# Patient Record
Sex: FEMALE | Race: BLACK OR AFRICAN AMERICAN | Employment: UNEMPLOYED | URBAN - METROPOLITAN AREA
[De-identification: names, ages, dates, MRNs, and addresses within clinical notes are randomized per-mention and may not be internally consistent; named-entity substitution may affect disease eponyms.]

---

## 2017-03-15 ENCOUNTER — DOCUMENTATION ONLY (OUTPATIENT)
Dept: ONCOLOGY | Age: 62
End: 2017-03-15

## 2017-03-15 NOTE — PROGRESS NOTES
[address]   [address] [facility] [place], [address] [facility], [address] [place] [address]     Paget's Disease Survivorship Care Plan    GENERAL INFORMATION    NAME/AGE/GENDER: Anamaria Chen is a 63 y.o. female who was born on 1955. 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 Trena Leary     Please continue to see your primary care provider for all general health care recommended for a woman your age, including cancer screening tests. You no longer need RIGHT mammograms.     Possible late and long-term effects that someone with this type of cancer and treatment may experience: lymphedema    Schedule of Clinical Visits        Coordinating Provider  When/How often Contact information   Primary Care Provider As needed for non-cancer health care [phone]    Surgeon Return in June 2017 and Dr. Trena Leary will advise you then on schedule of future F/U visits  [phone]   Plastic Surgeon As he recommends in your particular situation [phone]     CANCER TREATMENT SUMMARY    Surgery: Date and procedure/location/findings: 10/6/16 RIGHT nipple areolar complex excision, RIGHT axillary sentinel lymph node biopsy and BILATERAL breast reduction, margins clear, 2 lymph nodes removed--both were negative for cancer; RIGHT breast tissue with DCIS present on 5 slides, LEFT breast tissue with benign findings only  16 RIGHT skin-sparing mastectomy and immediate reconstruction with tissue expander, no residual DCIS found    Radiation: No     Persistent symptoms or side effects at completion of treatment: No     Clinical Trial: No     Date of other cancer and/or recurrence of primary cancer and subsequent treatment: none, according to documentation in [address] [place] [address], [facility] electronic medical record    FAMILIAL CANCER RISK ASSESSMENT    Family history of cancer: mother and father with unknown types of cancer, now , according to [facility]    Genetic/hereditary risk factor(s) or predisposing conditions: none apparent  Genetic testing: No     CONTINUING TREATMENT    Need for Ongoing (Adjuvant) Treatment for Cancer: No, your DCIS did not have estrogen or progesterone receptors present (it was ER/[place] negative) and you would not have benefited from these treatments     DOING YOUR PART AS A CANCER SURVIVOR    Many survivors feel worried or anxious that the cancer will come back after treatment. While it often does not, its important to talk with your doctor about the possibility of the cancer returning. Most breast cancer recurrences are found by patients between visits. Tell your doctor if you notice any of the following symptoms, as they may be signs of a cancer recurrence:    · New lumps in the LEFT breast or skin of the RIGHT chest wall  · Bone pain  · Chest pain  · Abdominal pain  · Shortness of breath or difficulty breathing  · Persistent headaches  · Persistent coughing  · Rash on chest    Or any other symptoms should be brought to the attention of your provider:   1.  Anything that represents a brand new symptom   2. Anything that represents a persistent symptom   3. Anything you are worried about that might be related to the cancer coming back    Cancer survivors may experience issues with the areas listed below. If you have any concerns in these or other areas, please speak with your survivorship nurse practitioner or a member of your physician team to find out how to get help with them. Emotional and mental health, fatigue, weight changes, stopping smoking, physical functioning, insurance, financial advice/assistance, school/work issues, parenting, memory or concentration loss, fertility, sexual functioning, or any other survivorship concerns            General Guidelines for Health and Cancer Prevention/Risk Reduction      · Work to achieve and maintain a healthy weight  · Engage in regular physical activity including:  Aerobic exercise at least 150 minutes per week                            Strength training exercise at least 2 days per week    · Eat a diet that is high in vegetables, fruits, whole grains, legumes (beans) and low in saturated fats (animal fats)    · Lavonna Her not start using tobacco  · Limit alcohol consumption to no more than 1 drink per day for women/no more than 2 drinks per day for men    If you have any questions or need additional information/support, please discuss these recommendations with your doctor or nurse practitioner.          RESOURCES FOR THE JOURNEY    Breast Cancer Specific:  Living Beyond Breast Cancer www.lbbc.org  Breast Cancer. org NotSimilar.no. [facility] http://ww5.saran. [address] www.vbcf. org    General:  [employer] www.livestrong. [facility] www.cancer. [address] www.cancer. org  American Society of Clinical Oncology http://chavarria-vazquez.com/. net/research-and-advocacy/asco-care-and-treatment-  recommendations-patients/follow-care-breast-cancer  [facility] [employer] www.aicr. [place] [employer] www.canceradvocacy. [address] www.nccn. org  [employer] www. patientadvocate. org    Prepared By: Mynor MOSS  [address]  [phone] or [phone]  [email or web address]    Delivered on: 3/16/17    This [address] [place] [address] is a cancer treatment summary and follow-up plan provided to you to keep with your healthcare records and to share with your healthcare providers. This summary is a brief record of major aspects of your cancer treatment, not a detailed or comprehensive record of your care.

## 2017-04-12 ENCOUNTER — OFFICE VISIT (OUTPATIENT)
Dept: INTERNAL MEDICINE CLINIC | Age: 62
End: 2017-04-12

## 2017-04-12 VITALS
OXYGEN SATURATION: 98 % | RESPIRATION RATE: 16 BRPM | TEMPERATURE: 96.3 F | DIASTOLIC BLOOD PRESSURE: 100 MMHG | SYSTOLIC BLOOD PRESSURE: 160 MMHG | BODY MASS INDEX: 44.39 KG/M2 | WEIGHT: 276.2 LBS | HEIGHT: 66 IN | HEART RATE: 77 BPM

## 2017-04-12 DIAGNOSIS — E66.01 MORBID OBESITY, UNSPECIFIED OBESITY TYPE (HCC): ICD-10-CM

## 2017-04-12 DIAGNOSIS — I10 ESSENTIAL HYPERTENSION: Primary | ICD-10-CM

## 2017-04-12 DIAGNOSIS — R06.83 SNORES: ICD-10-CM

## 2017-04-12 RX ORDER — HYDROCHLOROTHIAZIDE 25 MG/1
25 TABLET ORAL DAILY
Qty: 30 TAB | Refills: 1 | Status: SHIPPED | OUTPATIENT
Start: 2017-04-12 | End: 2017-08-16 | Stop reason: SDUPTHER

## 2017-04-12 NOTE — PROGRESS NOTES
Subjective:     Chief Complaint   Patient presents with   [facility].        She  is a 63y.o. 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 She has an appointment for breast reconstructive surgery in 5/3/2017. Never been diagnosed with hypertension but after reviewing her BP numbers for the last one year I notice that its always been elevated. She denies any chest pain, soa or orthopnea. C/O snores at night. Sometimes she wakes up with gasping air. Never had sleep study done in the past.     No other concerns. A comprehensive review of systems was negative except for that written in the HPI. Objective:     Vitals:    04/12/17 0841 04/12/17 0912   BP: (!) 156/91 (!) 160/100   Pulse: 77    Resp: 16    Temp: 96.3 °F (35.7 °C)    TempSrc: Oral    SpO2: 98%    Weight: 276 lb 3.2 oz (125.3 kg)    Height: 5' 6\" (1.676 m)        Physical Examination: General appearance - alert, well appearing, and in no distress, oriented to person, place, and time and overweight  Mental status - alert, oriented to person, place, and time, normal mood, behavior, speech, dress, motor activity, and thought processes  Chest - clear to auscultation, no wheezes, rales or rhonchi, symmetric air entry  Heart - normal rate, regular rhythm, normal S1, S2, no murmurs, rubs, clicks or gallops  Neurological - alert, oriented, normal speech, no focal findings or movement disorder noted  Musculoskeletal - 1 + edema in both leg.      Allergies   Allergen Reactions    Other Plant, Animal, Environmental Rash and Itching     Patient reports rash and itching on hands from powder in gloves      Social History     Social History    Marital status:      Spouse name: N/A    Number of children: N/A    Years of education: N/A     Social History Main Topics    Smoking status: Never Smoker    Smokeless tobacco: Never Used    Alcohol use 0.0 oz/week     0 Standard drinks or equivalent per week      Comment: rarely    Drug use: No    Sexual activity: Not Asked     Other Topics Concern    None     Social History Narrative      Family History   Problem Relation Age of Onset    Heart Disease Mother     Hypertension Mother     Diabetes Mother     Cancer Mother      unknown    Heart Disease Father     Cancer Father      unknown    Hypertension Sister     Hypertension Brother     Hypertension Sister       Past Surgical History:   Procedure Laterality Date    HX BREAST LUMPECTOMY Right 10/6/2016    RIGHT BREAST CENTRAL LUMPECTOMY, RIGHT SENTINEL NODE BIOPSY, BILATERAL BREAST REDUCTION performed by Luis Fernando Ponce MD at [facility] MAIN OR    HX BREAST RECONSTRUCTION Right 11/18/2016    BREAST RECONSTRUCTION [facility]    HX BREAST REDUCTION Bilateral 10/6/2016    BREAST REDUCTION performed [facility] at [facility]      surgery for endometriosis   [facility]      [facility] and [facility].  HX MASTECTOMY Right 11/18/2016    RIGHT SKIN SPARING MASTECTOMY,RIGHT BREAST RECONSTRUCTION WITH TISSUE EXPANDER ALLODERM performed by Luis Fernando Ponce MD at [facility] MAIN OR    HX PARTIAL HYSTERECTOMY      HX TUBAL LIGATION        Past Medical History:   Diagnosis Date    Arthritis     Cancer ([facility] [address]     right breast    Headache     Ill-defined condition     endometrosis    Nausea & vomiting       Current Outpatient Prescriptions   Medication Sig Dispense Refill    hydroCHLOROthiazide (HYDRODIURIL) 25 mg tablet Take 1 Tab by mouth daily. Indications: hypertension 30 Tab 1    acetaminophen (TYLENOL EXTRA STRENGTH) 500 mg tablet Take 1,000 mg by mouth every six (6) hours as needed for Pain. Assessment/ Plan:   Jaylen Lopes was seen today for establish care.     Diagnoses and all orders for this visit:    Essential hypertension  -    After reviewing her last documented BP numbers and after rechecking her BP in the triage we have decided to start treating. Patient agreed with my plan. -    Will start with  hydroCHLOROthiazide (HYDRODIURIL) 25 mg tablet; Take 1 Tab by mouth daily. Indications: hypertension. Monitor BP daily. Will consider amlodipine if not controlled with above med. Discussed weight reduction strategy, low salt diet, exercise. -     METABOLIC PANEL, BASIC    Snores  -     SLEEP MEDICINE REFERRAL    Morbid obesity, unspecified obesity type  -     SLEEP MEDICINE REFERRAL  -    Discussed weight reduction strategy, low salt diet, exercise. Medication risks/benefits/costs/interactions/alternatives discussed with patient. Advised patient to call back or return to office if symptoms worsen/change/persist. If patient cannot reach us or should anything more severe/urgent arise he/she should proceed directly to the nearest emergency department. Discussed expected course/resolution/complications of diagnosis in detail with patient. Patient given a written after visit summary which includes her diagnoses, current medications and vitals. Patient expressed understanding with the diagnosis and plan. Follow-up Disposition:  Return for Bring BP log book. , complete physical  and fasting blood work. Elo Doan

## 2017-04-12 NOTE — MR AVS SNAPSHOT
Visit Information Date & Time Provider Department Dept. Phone Encounter #  
 4/12/2017  8:30 AM Amaury Ibarra MD [facility] [phone] [id number] Follow-up Instructions Return for Bring BP log book. , complete physical  and fasting blood work. Emely Rubalcava Your Appointments 6/20/2017  9:20 AM  
ESTABLISHED PATIENT with Rhonda Aldana MD  
[facility] Dr. Giovanny Arellano [facility] ([facility]) Appt Note: 6 month breast check [address] [phone]  
[address] [facility], [address] [place] [address] [address] [phone] Upcoming Health Maintenance Date Due Hepatitis C Screening 1955 Pneumococcal 19-64 Highest Risk (1 of 3 - PCV13) 11/19/1976 DTaP/Tdap/Td series (1 - Tdap) 11/19/1978 PAP AKA CERVICAL CYTOLOGY 11/19/1978 FOBT Q 1 YEAR AGE 50-75 11/19/2007 BREAST CANCER SCRN MAMMOGRAM 6/9/2018 Allergies as of 4/12/2017  Review Complete On: 4/12/2017 By: Isidro Carson MD  
  
 Severity Noted Reaction Type Reactions Other Plant, Animal, Environmental  10/06/2016    Rash, Itching Patient reports rash and itching on hands from powder in gloves Current Immunizations  Never Reviewed Name Date Influenza Vaccine (Quad) PF 10/7/2016  9:37 AM  
  
 Not reviewed this visit You Were Diagnosed With   
  
 Codes Comments Essential hypertension    -  Primary ICD-10-CM: I10 
ICD-9-CM: 401.9 Snores     ICD-10-CM: R06.83 
ICD-9-CM: 786.09 Morbid obesity, unspecified obesity type     ICD-10-CM: E66.01 
ICD-9-CM: 278.01 Vitals BP Pulse Temp Resp Height(growth percentile) Weight(growth percentile) (!) 156/91 (BP 1 Location: Left arm, BP Patient Position: Sitting) 77 96.3 °F (35.7 °C) (Oral) 16 5' 6\" (1.676 m) 276 lb 3.2 oz (125.3 kg) SpO2 BMI OB Status Smoking Status 98% 44.58 kg/m2 Hysterectomy Never Smoker Vitals History BMI and BSA Data Body Mass Index Body Surface Area 44.58 kg/m 2 2.42 m 2 Preferred Pharmacy Pharmacy Name Phone [id number] [address], [address] [phone] Your Updated Medication List  
  
   
This list is accurate as of: 4/12/17  9:08 AM.  Always use your most recent med list.  
  
  
  
  
 hydroCHLOROthiazide 25 mg tablet Commonly known as:  HYDRODIURIL Take 1 Tab by mouth daily. Indications: hypertension TYLENOL EXTRA STRENGTH 500 mg tablet Generic drug:  acetaminophen Take 1,000 mg by mouth every six (6) hours as needed for Pain. Prescriptions Sent to Pharmacy Refills  
 hydroCHLOROthiazide (HYDRODIURIL) 25 mg tablet 1 Sig: Take 1 Tab by mouth daily. Indications: hypertension Class: Normal  
 Pharmacy: [id number] [address], [place] Ph #: [phone] Route: Oral  
  
We Performed the Following METABOLIC PANEL, BASIC [52730 CPT(R)] SLEEP MEDICINE REFERRAL [ITK964 Custom] Comments:  
 Orders: 
Sleep Medicine Consult - Schedule patient for a sleep specialist consult. If appropriate, schedule patient for sleep study(s). Initiate treatment if needed. Forward correspondance to my office. Follow-up Instructions Return for Bring BP log book. , complete physical  and fasting blood work. Summer Barakat To-Do List   
 04/26/2017 10:00 AM  
  Appointment with [facility] PAT ROOM P1 at [facility] OR [facility] TESTING ([phone])  
  
 05/01/2017 9:30 AM  
  Appointment with [facility] K[facility] 1 at [address] [place] [address] ([phone]) Shower or bathe using soap and water. Do not use deodorant, powder, perfumes, or lotion the day of your exam.  If your prior mammograms were not performed at [facility] please bring films with you or forward prior images 2 days before your procedure.   Check in at registration 15min before your appointment time unless you were instructed to do otherwise. A script is not necessary for screening. If you have one, please bring it on the day of the mammogram or have it faxed to the department. [facility]  [phone] [facility] [phone] [facility] [place] [phone] [facility] [phone] Referral Information Referral ID Referred By Referred To  
  
 [id number] LOKI DREW MD   
   [address] [place] [address] Phone: [phone] Fax: [phone] Visits Status Start Date End Date 1 New Request 4/12/17 4/12/18 If your referral has a status of pending review or denied, additional information will be sent to support the outcome of this decision. Patient Instructions Low Sodium Diet (2,000 Milligram): Care Instructions Your Care Instructions Too much sodium causes your body to hold on to extra water. This can raise your blood pressure and force your heart and kidneys to work harder. In very serious cases, this could cause you to be put in the hospital. It might even be life-threatening. By limiting sodium, you will feel better and lower your risk of serious problems. The most common source of sodium is salt. People get most of the salt in their diet from canned, prepared, and packaged foods. Fast food and restaurant meals also are very high in sodium. Your doctor will probably limit your sodium to less than 2,000 milligrams (mg) a day. This limit counts all the sodium in prepared and packaged foods and any salt you add to your food. Follow-up care is a key part of your treatment and safety. Be sure to make and go to all appointments, and call your doctor if you are having problems. It's also a good idea to know your test results and keep a list of the medicines you take. How can you care for yourself at home? Read food labels · Read labels on cans and food packages.  The labels tell you how much sodium is in each serving. Make sure that you look at the serving size. If you eat more than the serving size, you have eaten more sodium. · Food labels also tell you the Percent Daily Value for sodium. Choose products with low Percent Daily Values for sodium. · Be aware that sodium can come in forms other than salt, including monosodium glutamate (MSG), sodium citrate, and sodium bicarbonate (baking soda). MSG is often added to Asian food. When you eat out, you can sometimes ask for food without MSG or added salt. Buy low-sodium foods · Buy foods that are labeled \"unsalted\" (no salt added), \"sodium-free\" (less than 5 mg of sodium per serving), or \"low-sodium\" (less than 140 mg of sodium per serving). Foods labeled \"reduced-sodium\" and \"light sodium\" may still have too much sodium. Be sure to read the label to see how much sodium you are getting. · Buy fresh vegetables, or frozen vegetables without added sauces. Buy low-sodium versions of canned vegetables, soups, and other canned goods. Prepare low-sodium meals · Cut back on the amount of salt you use in cooking. This will help you adjust to the taste. Do not add salt after cooking. One teaspoon of salt has about 2,300 mg of sodium. · Take the salt shaker off the table. · Flavor your food with garlic, lemon juice, onion, vinegar, herbs, and spices. Do not use soy sauce, lite soy sauce, steak sauce, onion salt, garlic salt, celery salt, mustard, or ketchup on your food. · Use low-sodium salad dressings, sauces, and ketchup. Or make your own salad dressings and sauces without adding salt. · Use less salt (or none) when recipes call for it. You can often use half the salt a recipe calls for without losing flavor. Other foods such as rice, pasta, and grains do not need added salt. · Rinse canned vegetables, and cook them in fresh water. This removes somebut not allof the salt.  
· Avoid water that is naturally high in sodium or that has been treated with water softeners, which add sodium. Call your local water company to find out the sodium content of your water supply. If you buy bottled water, read the label and choose a sodium-free brand. Avoid high-sodium foods · Avoid eating: ¨ Smoked, cured, salted, and canned meat, fish, and poultry. ¨ Ham, perez, hot dogs, and luncheon meats. ¨ Regular, hard, and processed cheese and regular peanut butter. ¨ Crackers with salted tops, and other salted snack foods such as pretzels, chips, and salted popcorn. ¨ Frozen prepared meals, unless labeled low-sodium. ¨ Canned and dried soups, broths, and bouillon, unless labeled sodium-free or low-sodium. ¨ Canned vegetables, unless labeled sodium-free or low-sodium. ¨ Western Sonam fries, pizza, tacos, and other fast foods. ¨ Pickles, olives, ketchup, and other condiments, especially soy sauce, unless labeled sodium-free or low-sodium. Where can you learn more? Go to http://lorneAnonymous Youmihcael.info/. Enter T996 in the search box to learn more about \"Low Sodium Diet (2,000 Milligram): Care Instructions. \" Current as of: July 26, 2016 Content Version: 11.2 © 5560-4023 [employer]. Care instructions adapted under license by [employer] (which disclaims liability or warranty for this information). If you have questions about a medical condition or this instruction, always ask your healthcare professional. [place] [address] any warranty or liability for your use of this information. Starting a Weight Loss Plan: Care Instructions Your Care Instructions If you are thinking about losing weight, it can be hard to know where to start. Your doctor can help you set up a weight loss plan that best meets your needs. You may want to take a class on nutrition or exercise, or join a weight loss support group.  If you have questions about how to make changes to your eating or exercise habits, ask your doctor about seeing a registered dietitian or an exercise specialist. 
It can be a big challenge to lose weight. But you do not have to make huge changes at once. Make small changes, and stick with them. When those changes become habit, add a few more changes. If you do not think you are ready to make changes right now, try to pick a date in the future. Make an appointment to see your doctor to discuss whether the time is right for you to start a plan. Follow-up care is a key part of your treatment and safety. Be sure to make and go to all appointments, and call your doctor if you are having problems. Its also a good idea to know your test results and keep a list of the medicines you take. How can you care for yourself at home? · Set realistic goals. Many people expect to lose much more weight than is likely. A weight loss of 5% to 10% of your body weight may be enough to improve your health. · Get family and friends involved to provide support. Talk to them about why you are trying to lose weight, and ask them to help. They can help by participating in exercise and having meals with you, even if they may be eating something different. · Find what works best for you. If you do not have time or do not like to cook, a program that offers meal replacement bars or shakes may be better for you. Or if you like to prepare meals, finding a plan that includes daily menus and recipes may be best. 
· Ask your doctor about other health professionals who can help you achieve your weight loss goals. ¨ A dietitian can help you make healthy changes in your diet. ¨ An exercise specialist or  can help you develop a safe and effective exercise program. 
¨ A counselor or psychiatrist can help you cope with issues such as depression, anxiety, or family problems that can make it hard to focus on weight loss. · Consider joining a support group for people who are trying to lose weight. Your doctor can suggest groups in your area. Where can you learn more? Go to http://lorne-michael.info/. Enter M743 in the search box to learn more about \"Starting a Weight Loss Plan: Care Instructions. \" Current as of: October 13, 2016 Content Version: 11.2 © 8301-4662 [employer]. Care instructions adapted under license by [employer] (which disclaims liability or warranty for this information). If you have questions about a medical condition or this instruction, always ask your healthcare professional. [address] any warranty or liability for your use of this information. Introducing \[facility]\"" & HEALTH SERVICES! Joseline Tavarez introduces [employer] patient portal. Now you can access parts of your medical record, email your doctor's office, and request medication refills online. 1. In your internet browser, go to [email or web address] 2. Click on the First Time User? Click Here link in the Sign In box. You will see the New Member Sign Up page. 3. Enter your [employer] Access Code exactly as it appears below. You will not need to use this code after youve completed the sign-up process. If you do not sign up before the expiration date, you must request a new code. · [employer] Access Code: [email or web address] Expires: 7/11/2017  9:08 AM 
 
4. Enter the last four digits of your Social Security Number (xxxx) and Date of Birth (mm/dd/yyyy) as indicated and click Submit. You will be taken to the next sign-up page. 5. Create a [employer] ID. This will be your [employer] login ID and cannot be changed, so think of one that is secure and easy to remember. 6. Create a [employer] password. You can change your password at any time. 7. Enter your Password Reset Question and Answer. This can be used at a later time if you forget your password. 8. Enter your e-mail address. You will receive e-mail notification when new information is available in [address] 9. Click Sign Up. You can now view and download portions of your medical record. 10. Click the Download Summary menu link to download a portable copy of your medical information. If you have questions, please visit the Frequently Asked Questions section of the [employer] website. Remember, [employer] is NOT to be used for urgent needs. For medical emergencies, dial 911. Now available from your iPhone and Android! Please provide this summary of care documentation to your next provider. Your primary care clinician is listed as Amaury Mckinnon. If you have any questions after today's visit, please call [phone].

## 2017-04-12 NOTE — PATIENT INSTRUCTIONS
Low Sodium Diet (2,000 Milligram): Care Instructions  Your Care Instructions  Too much sodium causes your body to hold on to extra water. This can raise your blood pressure and force your heart and kidneys to work harder. In very serious cases, this could cause you to be put in the hospital. It might even be life-threatening. By limiting sodium, you will feel better and lower your risk of serious problems. The most common source of sodium is salt. People get most of the salt in their diet from canned, prepared, and packaged foods. Fast food and restaurant meals also are very high in sodium. Your doctor will probably limit your sodium to less than 2,000 milligrams (mg) a day. This limit counts all the sodium in prepared and packaged foods and any salt you add to your food. Follow-up care is a key part of your treatment and safety. Be sure to make and go to all appointments, and call your doctor if you are having problems. It's also a good idea to know your test results and keep a list of the medicines you take. How can you care for yourself at home? Read food labels  · Read labels on cans and food packages. The labels tell you how much sodium is in each serving. Make sure that you look at the serving size. If you eat more than the serving size, you have eaten more sodium. · Food labels also tell you the Percent Daily Value for sodium. Choose products with low Percent Daily Values for sodium. · Be aware that sodium can come in forms other than salt, including monosodium glutamate (MSG), sodium citrate, and sodium bicarbonate (baking soda). MSG is often added to Asian food. When you eat out, you can sometimes ask for food without MSG or added salt. Buy low-sodium foods  · Buy foods that are labeled \"unsalted\" (no salt added), \"sodium-free\" (less than 5 mg of sodium per serving), or \"low-sodium\" (less than 140 mg of sodium per serving).  Foods labeled \"reduced-sodium\" and \"light sodium\" may still have too much sodium. Be sure to read the label to see how much sodium you are getting. · Buy fresh vegetables, or frozen vegetables without added sauces. Buy low-sodium versions of canned vegetables, soups, and other canned goods. Prepare low-sodium meals  · Cut back on the amount of salt you use in cooking. This will help you adjust to the taste. Do not add salt after cooking. One teaspoon of salt has about 2,300 mg of sodium. · Take the salt shaker off the table. · Flavor your food with garlic, lemon juice, onion, vinegar, herbs, and spices. Do not use soy sauce, lite soy sauce, steak sauce, onion salt, garlic salt, celery salt, mustard, or ketchup on your food. · Use low-sodium salad dressings, sauces, and ketchup. Or make your own salad dressings and sauces without adding salt. · Use less salt (or none) when recipes call for it. You can often use half the salt a recipe calls for without losing flavor. Other foods such as rice, pasta, and grains do not need added salt. · Rinse canned vegetables, and cook them in fresh water. This removes somebut not allof the salt. · Avoid water that is naturally high in sodium or that has been treated with water softeners, which add sodium. Call your local water company to find out the sodium content of your water supply. If you buy bottled water, read the label and choose a sodium-free brand. Avoid high-sodium foods  · Avoid eating:  ¨ Smoked, cured, salted, and canned meat, fish, and poultry. ¨ Ham, perez, hot dogs, and luncheon meats. ¨ Regular, hard, and processed cheese and regular peanut butter. ¨ Crackers with salted tops, and other salted snack foods such as pretzels, chips, and salted popcorn. ¨ Frozen prepared meals, unless labeled low-sodium. ¨ Canned and dried soups, broths, and bouillon, unless labeled sodium-free or low-sodium. ¨ Canned vegetables, unless labeled sodium-free or low-sodium. ¨ Western Sonam fries, pizza, tacos, and other fast foods.   Corrinne Jakes, olives, ketchup, and other condiments, especially soy sauce, unless labeled sodium-free or low-sodium. Where can you learn more? Go to http://lorne-michael.info/. Enter R008 in the search box to learn more about \"Low Sodium Diet (2,000 Milligram): Care Instructions. \"  Current as of: July 26, 2016  Content Version: 11.2  © 7810-6437 [employer]. Care instructions adapted under license by [employer] (which disclaims liability or warranty for this information). If you have questions about a medical condition or this instruction, always ask your healthcare professional. [address] any warranty or liability for your use of this information. Starting a Weight Loss Plan: Care Instructions  Your Care Instructions  If you are thinking about losing weight, it can be hard to know where to start. Your doctor can help you set up a weight loss plan that best meets your needs. You may want to take a class on nutrition or exercise, or join a weight loss support group. If you have questions about how to make changes to your eating or exercise habits, ask your doctor about seeing a registered dietitian or an exercise specialist.  It can be a big challenge to lose weight. But you do not have to make huge changes at once. Make small changes, and stick with them. When those changes become habit, add a few more changes. If you do not think you are ready to make changes right now, try to pick a date in the future. Make an appointment to see your doctor to discuss whether the time is right for you to start a plan. Follow-up care is a key part of your treatment and safety. Be sure to make and go to all appointments, and call your doctor if you are having problems. Its also a good idea to know your test results and keep a list of the medicines you take. How can you care for yourself at home? · Set realistic goals.  Many people expect to lose much more weight than is likely. A weight loss of 5% to 10% of your body weight may be enough to improve your health. · Get family and friends involved to provide support. Talk to them about why you are trying to lose weight, and ask them to help. They can help by participating in exercise and having meals with you, even if they may be eating something different. · Find what works best for you. If you do not have time or do not like to cook, a program that offers meal replacement bars or shakes may be better for you. Or if you like to prepare meals, finding a plan that includes daily menus and recipes may be best.  · Ask your doctor about other health professionals who can help you achieve your weight loss goals. ¨ A dietitian can help you make healthy changes in your diet. ¨ An exercise specialist or  can help you develop a safe and effective exercise program.  ¨ A counselor or psychiatrist can help you cope with issues such as depression, anxiety, or family problems that can make it hard to focus on weight loss. · Consider joining a support group for people who are trying to lose weight. Your doctor can suggest groups in your area. Where can you learn more? Go to http://lorne-michael.info/. Enter Y193 in the search box to learn more about \"Starting a Weight Loss Plan: Care Instructions. \"  Current as of: October 13, 2016  Content Version: 11.2  © 1749-1963 [employer]. Care instructions adapted under license by [employer] (which disclaims liability or warranty for this information). If you have questions about a medical condition or this instruction, always ask your healthcare professional. [place] [address] any warranty or liability for your use of this information.

## 2017-04-13 ENCOUNTER — TELEPHONE (OUTPATIENT)
Dept: INTERNAL MEDICINE CLINIC | Age: 62
End: 2017-04-13

## 2017-04-13 LAB
BUN SERPL-MCNC: 9 MG/DL (ref 6–24)
BUN/CREAT SERPL: 16 (ref 9–23)
CALCIUM SERPL-MCNC: 9.6 MG/DL (ref 8.7–10.2)
CHLORIDE SERPL-SCNC: 102 MMOL/L (ref 96–106)
CO2 SERPL-SCNC: 21 MMOL/L (ref 18–29)
CREAT SERPL-MCNC: 0.58 MG/DL (ref 0.57–1)
GLUCOSE SERPL-MCNC: 98 MG/DL (ref 65–99)
POTASSIUM SERPL-SCNC: 4.2 MMOL/L (ref 3.5–5.2)
SODIUM SERPL-SCNC: 142 MMOL/L (ref 134–144)

## 2017-04-13 NOTE — TELEPHONE ENCOUNTER
----- Message from Kera Thornton MD sent at 4/13/2017  8:47 AM EDT -----  Please call her to Inform her that Kidney function is normal.

## 2017-04-26 ENCOUNTER — HOSPITAL ENCOUNTER (OUTPATIENT)
Dept: PREADMISSION TESTING | Age: 62
Discharge: HOME | End: 2017-04-26
Attending: PLASTIC SURGERY
Payer: COMMERCIAL

## 2017-04-26 VITALS
SYSTOLIC BLOOD PRESSURE: 138 MMHG | OXYGEN SATURATION: 96 % | HEART RATE: 98 BPM | BODY MASS INDEX: 43.11 KG/M2 | HEIGHT: 67 IN | DIASTOLIC BLOOD PRESSURE: 66 MMHG | WEIGHT: 274.69 LBS | TEMPERATURE: 98 F

## 2017-04-26 LAB
ANION GAP BLD CALC-SCNC: 8 MMOL/L (ref 5–15)
BUN SERPL-MCNC: 12 MG/DL (ref 6–20)
BUN/CREAT SERPL: 16 (ref 12–20)
CALCIUM SERPL-MCNC: 9.3 MG/DL (ref 8.5–10.1)
CHLORIDE SERPL-SCNC: 107 MMOL/L (ref 97–108)
CO2 SERPL-SCNC: 30 MMOL/L (ref 21–32)
CREAT SERPL-MCNC: 0.77 MG/DL (ref 0.55–1.02)
ERYTHROCYTE [DISTWIDTH] IN BLOOD BY AUTOMATED COUNT: 15.4 % (ref 11.5–14.5)
GLUCOSE SERPL-MCNC: 103 MG/DL (ref 65–100)
HCT VFR BLD AUTO: 41.2 % (ref 35–47)
HGB BLD-MCNC: 13.4 G/DL (ref 11.5–16)
MCH RBC QN AUTO: 29.1 PG (ref 26–34)
MCHC RBC AUTO-ENTMCNC: 32.5 G/DL (ref 30–36.5)
MCV RBC AUTO: 89.4 FL (ref 80–99)
PLATELET # BLD AUTO: 274 K/UL (ref 150–400)
POTASSIUM SERPL-SCNC: 3.7 MMOL/L (ref 3.5–5.1)
RBC # BLD AUTO: 4.61 M/UL (ref 3.8–5.2)
SODIUM SERPL-SCNC: 145 MMOL/L (ref 136–145)
WBC # BLD AUTO: 5.6 K/UL (ref 3.6–11)

## 2017-04-26 PROCEDURE — 36415 COLL VENOUS BLD VENIPUNCTURE: CPT | Performed by: ANESTHESIOLOGY

## 2017-04-26 PROCEDURE — 85027 COMPLETE CBC AUTOMATED: CPT | Performed by: ANESTHESIOLOGY

## 2017-04-26 PROCEDURE — 80048 BASIC METABOLIC PNL TOTAL CA: CPT | Performed by: ANESTHESIOLOGY

## 2017-04-26 NOTE — PERIOP NOTES
[facility]  Preoperative Instructions        Surgery Date 5/3/17          Time of Arrival 10:30am    1. On the day of your surgery, please report to the Surgical Services Registration Desk and sign in at your designated time. The Surgery Center is located to the right of the Emergency Room. 2. You must have someone with you to drive you home. You should not drive a car for 24 hours following surgery. Please make arrangements for a friend or family member to stay with you for the first 24 hours after your surgery. 3. Do not have anything to eat or drink (including water, gum, mints, coffee, juice) after midnight 5/2/17              . This may not apply to medications prescribed by your physician. Please note special instructions, if applicable. If you are currently taking Plavix, Coumadin, or other blood-thinning agents, contact your surgeon for instructions. 4. We recommend you do not drink any alcoholic beverages for 24 hours before and after your surgery. 5. Have a list of all current medications, including vitamins, herbal supplements and any other over the counter medications. Stop all Aspirin and non-steroidal anti-inflammatory drugs (I.e. Advil, Aleve), as directed by your surgeon's office. Stop all vitamins and herbal supplements seven days prior to your surgery. 6. Wear comfortable clothes. Wear glasses instead of contacts. Do not bring any money or jewelry. Please bring picture ID, insurance card, and any prearranged co-payment or hospital payment. Do not wear make-up, particularly mascara the morning of your surgery. Do not wear nail polish, particularly if you are having foot /hand surgery. Wear your hair loose or down, no ponytails, buns, alex pins or clips. All body piercings must be removed.   Please shower with antibacterial soap for three consecutive days before and on the morning of surgery, but do not apply any lotions, powders or deodorants after the shower on the day of surgery. Please use a fresh towels after each shower. Please sleep in clean clothes and change bed linens the night before surgery. Please do not shave for 48 hours prior to surgery. Shaving of the face is acceptable. 7. You should understand that if you do not follow these instructions your surgery may be cancelled. If your physical condition changes (I.e. fever, cold or flu) please contact your surgeon as soon as possible. 8. It is important that you be on time. If a situation occurs where you may be late, please call [phone] (OR Holding Area). 9. If you have any questions and or problems, please call [phone] (Pre-admission Testing). 10. Your surgery time may be subject to change. You will receive a phone call the evening prior if your time changes. 11.  If having outpatient surgery, you must have someone to drive you here, stay with you during the duration of your stay, and to drive you home at time of discharge. Special Instructions:    MEDICATIONS TO TAKE THE MORNING OF SURGERY WITH A SIP OF WATER: none      I understand a pre-operative phone call will be made to verify my surgery time. In the event that I am not available, I give permission for a message to be left on my answering service and/or with another person?   Yes [phone] or [phone]         ___________________      __________   _________    (Signature of Patient)             (Witness)                (Date and Time)

## 2017-05-01 ENCOUNTER — HOSPITAL ENCOUNTER (OUTPATIENT)
Dept: MAMMOGRAPHY | Age: 62
Discharge: HOME | End: 2017-05-01
Attending: SURGERY
Payer: COMMERCIAL

## 2017-05-01 DIAGNOSIS — Z12.31 ENCOUNTER FOR SCREENING MAMMOGRAM FOR HIGH-RISK PATIENT: ICD-10-CM

## 2017-05-01 PROCEDURE — 77063 BREAST TOMOSYNTHESIS BI: CPT

## 2017-05-03 ENCOUNTER — ANESTHESIA (OUTPATIENT)
Dept: SURGERY | Age: 62
End: 2017-05-03
Payer: COMMERCIAL

## 2017-05-03 ENCOUNTER — HOSPITAL ENCOUNTER (OUTPATIENT)
Age: 62
Setting detail: OUTPATIENT SURGERY
Discharge: HOME | End: 2017-05-03
Attending: PLASTIC SURGERY | Admitting: PLASTIC SURGERY
Payer: COMMERCIAL

## 2017-05-03 ENCOUNTER — ANESTHESIA EVENT (OUTPATIENT)
Dept: SURGERY | Age: 62
End: 2017-05-03
Payer: COMMERCIAL

## 2017-05-03 VITALS
RESPIRATION RATE: 16 BRPM | BODY MASS INDEX: 43.97 KG/M2 | HEIGHT: 66 IN | OXYGEN SATURATION: 94 % | TEMPERATURE: 97.7 F | HEART RATE: 83 BPM | WEIGHT: 273.59 LBS | DIASTOLIC BLOOD PRESSURE: 74 MMHG | SYSTOLIC BLOOD PRESSURE: 131 MMHG

## 2017-05-03 PROCEDURE — 77030008459 HC STPLR SKN COOP -B: Performed by: PLASTIC SURGERY

## 2017-05-03 PROCEDURE — 76210000021 HC REC RM PH II 0.5 TO 1 HR: Performed by: PLASTIC SURGERY

## 2017-05-03 PROCEDURE — 74011000250 HC RX REV CODE- 250: Performed by: PLASTIC SURGERY

## 2017-05-03 PROCEDURE — 74011250636 HC RX REV CODE- 250/636

## 2017-05-03 PROCEDURE — 74011250636 HC RX REV CODE- 250/636: Performed by: PLASTIC SURGERY

## 2017-05-03 PROCEDURE — 74011250637 HC RX REV CODE- 250/637

## 2017-05-03 PROCEDURE — 74011250636 HC RX REV CODE- 250/636: Performed by: ANESTHESIOLOGY

## 2017-05-03 PROCEDURE — 74011000272 HC RX REV CODE- 272: Performed by: PLASTIC SURGERY

## 2017-05-03 PROCEDURE — 76010000138 HC OR TIME 0.5 TO 1 HR: Performed by: PLASTIC SURGERY

## 2017-05-03 PROCEDURE — 77030002933 HC SUT MCRYL J&J -A: Performed by: PLASTIC SURGERY

## 2017-05-03 PROCEDURE — 76060000032 HC ANESTHESIA 0.5 TO 1 HR: Performed by: PLASTIC SURGERY

## 2017-05-03 PROCEDURE — 77030011640 HC PAD GRND REM COVD -A: Performed by: PLASTIC SURGERY

## 2017-05-03 PROCEDURE — 77030018836 HC SOL IRR NACL ICUM -A: Performed by: PLASTIC SURGERY

## 2017-05-03 PROCEDURE — 76210000017 HC OR PH I REC 1.5 TO 2 HR: Performed by: PLASTIC SURGERY

## 2017-05-03 PROCEDURE — 77030032490 HC SLV COMPR SCD KNE COVD -B: Performed by: PLASTIC SURGERY

## 2017-05-03 PROCEDURE — 77030011264 HC ELECTRD BLD EXT COVD -A: Performed by: PLASTIC SURGERY

## 2017-05-03 PROCEDURE — 77030010507 HC ADH SKN DERMBND J&J -B: Performed by: PLASTIC SURGERY

## 2017-05-03 PROCEDURE — 74011000250 HC RX REV CODE- 250

## 2017-05-03 PROCEDURE — 77030020143 HC AIRWY LARYN INTUB CGAS -A: Performed by: NURSE ANESTHETIST, CERTIFIED REGISTERED

## 2017-05-03 PROCEDURE — 77030020782 HC GWN BAIR PAWS FLX 3M -B

## 2017-05-03 PROCEDURE — 77030026227 HC FUNL KELLR 2 PCH ALGN -C: Performed by: PLASTIC SURGERY

## 2017-05-03 PROCEDURE — C1789 PROSTHESIS, BREAST, IMP: HCPCS | Performed by: PLASTIC SURGERY

## 2017-05-03 PROCEDURE — 77030013079 HC BLNKT BAIR HGGR 3M -A: Performed by: NURSE ANESTHETIST, CERTIFIED REGISTERED

## 2017-05-03 DEVICE — SMOOTH ROUND MODERATE PLUS PROFILE GEL-FILLED BREAST IMPLANT, 800CC  SMOOTH ROUND SILICONE
Type: IMPLANTABLE DEVICE | Site: BREAST | Status: FUNCTIONAL
Brand: MENTOR MEMORYGEL BREAST IMPLANT

## 2017-05-03 RX ORDER — SODIUM CHLORIDE 0.9 % (FLUSH) 0.9 %
5-10 SYRINGE (ML) INJECTION AS NEEDED
Status: DISCONTINUED | OUTPATIENT
Start: 2017-05-03 | End: 2017-05-03 | Stop reason: HOSPADM

## 2017-05-03 RX ORDER — LIDOCAINE HYDROCHLORIDE AND EPINEPHRINE 10; 10 MG/ML; UG/ML
INJECTION, SOLUTION INFILTRATION; PERINEURAL AS NEEDED
Status: DISCONTINUED | OUTPATIENT
Start: 2017-05-03 | End: 2017-05-03 | Stop reason: HOSPADM

## 2017-05-03 RX ORDER — PROPOFOL 10 MG/ML
INJECTION, EMULSION INTRAVENOUS
Status: DISCONTINUED | OUTPATIENT
Start: 2017-05-03 | End: 2017-05-03 | Stop reason: HOSPADM

## 2017-05-03 RX ORDER — ACETAMINOPHEN 10 MG/ML
INJECTION, SOLUTION INTRAVENOUS AS NEEDED
Status: DISCONTINUED | OUTPATIENT
Start: 2017-05-03 | End: 2017-05-03 | Stop reason: HOSPADM

## 2017-05-03 RX ORDER — LIDOCAINE HYDROCHLORIDE 10 MG/ML
0.1 INJECTION, SOLUTION EPIDURAL; INFILTRATION; INTRACAUDAL; PERINEURAL AS NEEDED
Status: DISCONTINUED | OUTPATIENT
Start: 2017-05-03 | End: 2017-05-03 | Stop reason: HOSPADM

## 2017-05-03 RX ORDER — HYDROMORPHONE HYDROCHLORIDE 1 MG/ML
0.2 INJECTION, SOLUTION INTRAMUSCULAR; INTRAVENOUS; SUBCUTANEOUS
Status: DISCONTINUED | OUTPATIENT
Start: 2017-05-03 | End: 2017-05-03 | Stop reason: HOSPADM

## 2017-05-03 RX ORDER — ONDANSETRON 2 MG/ML
INJECTION INTRAMUSCULAR; INTRAVENOUS AS NEEDED
Status: DISCONTINUED | OUTPATIENT
Start: 2017-05-03 | End: 2017-05-03 | Stop reason: HOSPADM

## 2017-05-03 RX ORDER — CEPHALEXIN 500 MG/1
500 CAPSULE ORAL 4 TIMES DAILY
Qty: 20 CAP | Refills: 0 | Status: SHIPPED | OUTPATIENT
Start: 2017-05-03 | End: 2017-08-16 | Stop reason: ALTCHOICE

## 2017-05-03 RX ORDER — DIPHENHYDRAMINE HYDROCHLORIDE 50 MG/ML
12.5 INJECTION, SOLUTION INTRAMUSCULAR; INTRAVENOUS AS NEEDED
Status: DISCONTINUED | OUTPATIENT
Start: 2017-05-03 | End: 2017-05-03 | Stop reason: HOSPADM

## 2017-05-03 RX ORDER — SODIUM CHLORIDE 0.9 % (FLUSH) 0.9 %
5-10 SYRINGE (ML) INJECTION EVERY 8 HOURS
Status: DISCONTINUED | OUTPATIENT
Start: 2017-05-03 | End: 2017-05-03 | Stop reason: HOSPADM

## 2017-05-03 RX ORDER — HYDROCODONE BITARTRATE AND ACETAMINOPHEN 5; 325 MG/1; MG/1
1-2 TABLET ORAL
Qty: 30 TAB | Refills: 0 | Status: SHIPPED | OUTPATIENT
Start: 2017-05-03 | End: 2017-08-16 | Stop reason: ALTCHOICE

## 2017-05-03 RX ORDER — ONDANSETRON 8 MG/1
8 TABLET, ORALLY DISINTEGRATING ORAL
Qty: 10 TAB | Refills: 2 | Status: SHIPPED | OUTPATIENT
Start: 2017-05-03 | End: 2017-08-16 | Stop reason: ALTCHOICE

## 2017-05-03 RX ORDER — DEXAMETHASONE SODIUM PHOSPHATE 4 MG/ML
INJECTION, SOLUTION INTRA-ARTICULAR; INTRALESIONAL; INTRAMUSCULAR; INTRAVENOUS; SOFT TISSUE AS NEEDED
Status: DISCONTINUED | OUTPATIENT
Start: 2017-05-03 | End: 2017-05-03 | Stop reason: HOSPADM

## 2017-05-03 RX ORDER — PROPOFOL 10 MG/ML
INJECTION, EMULSION INTRAVENOUS AS NEEDED
Status: DISCONTINUED | OUTPATIENT
Start: 2017-05-03 | End: 2017-05-03 | Stop reason: HOSPADM

## 2017-05-03 RX ORDER — MIDAZOLAM HYDROCHLORIDE 1 MG/ML
INJECTION, SOLUTION INTRAMUSCULAR; INTRAVENOUS AS NEEDED
Status: DISCONTINUED | OUTPATIENT
Start: 2017-05-03 | End: 2017-05-03 | Stop reason: HOSPADM

## 2017-05-03 RX ORDER — FENTANYL CITRATE 50 UG/ML
25 INJECTION, SOLUTION INTRAMUSCULAR; INTRAVENOUS
Status: DISCONTINUED | OUTPATIENT
Start: 2017-05-03 | End: 2017-05-03 | Stop reason: HOSPADM

## 2017-05-03 RX ORDER — SODIUM CHLORIDE, SODIUM LACTATE, POTASSIUM CHLORIDE, CALCIUM CHLORIDE 600; 310; 30; 20 MG/100ML; MG/100ML; MG/100ML; MG/100ML
25 INJECTION, SOLUTION INTRAVENOUS CONTINUOUS
Status: DISCONTINUED | OUTPATIENT
Start: 2017-05-03 | End: 2017-05-03 | Stop reason: HOSPADM

## 2017-05-03 RX ORDER — KETOROLAC TROMETHAMINE 30 MG/ML
INJECTION, SOLUTION INTRAMUSCULAR; INTRAVENOUS AS NEEDED
Status: DISCONTINUED | OUTPATIENT
Start: 2017-05-03 | End: 2017-05-03 | Stop reason: HOSPADM

## 2017-05-03 RX ORDER — KETAMINE HYDROCHLORIDE 10 MG/ML
INJECTION, SOLUTION INTRAMUSCULAR; INTRAVENOUS AS NEEDED
Status: DISCONTINUED | OUTPATIENT
Start: 2017-05-03 | End: 2017-05-03 | Stop reason: HOSPADM

## 2017-05-03 RX ORDER — SCOLOPAMINE TRANSDERMAL SYSTEM 1 MG/1
PATCH, EXTENDED RELEASE TRANSDERMAL AS NEEDED
Status: DISCONTINUED | OUTPATIENT
Start: 2017-05-03 | End: 2017-05-03 | Stop reason: HOSPADM

## 2017-05-03 RX ADMIN — CEFAZOLIN 3 G: 1 INJECTION, POWDER, FOR SOLUTION INTRAMUSCULAR; INTRAVENOUS; PARENTERAL at 12:29

## 2017-05-03 RX ADMIN — KETOROLAC TROMETHAMINE 30 MG: 30 INJECTION, SOLUTION INTRAMUSCULAR; INTRAVENOUS at 13:00

## 2017-05-03 RX ADMIN — ACETAMINOPHEN 1000 MG: 10 INJECTION, SOLUTION INTRAVENOUS at 12:38

## 2017-05-03 RX ADMIN — KETAMINE HYDROCHLORIDE 20 MG: 10 INJECTION, SOLUTION INTRAMUSCULAR; INTRAVENOUS at 12:31

## 2017-05-03 RX ADMIN — MIDAZOLAM HYDROCHLORIDE 2 MG: 1 INJECTION, SOLUTION INTRAMUSCULAR; INTRAVENOUS at 12:15

## 2017-05-03 RX ADMIN — MIDAZOLAM HYDROCHLORIDE 2 MG: 1 INJECTION, SOLUTION INTRAMUSCULAR; INTRAVENOUS at 12:45

## 2017-05-03 RX ADMIN — PROPOFOL 200 MG: 10 INJECTION, EMULSION INTRAVENOUS at 12:15

## 2017-05-03 RX ADMIN — PROPOFOL 100 MG: 10 INJECTION, EMULSION INTRAVENOUS at 12:45

## 2017-05-03 RX ADMIN — PROPOFOL 100 MCG/KG/MIN: 10 INJECTION, EMULSION INTRAVENOUS at 12:32

## 2017-05-03 RX ADMIN — SODIUM CHLORIDE, SODIUM LACTATE, POTASSIUM CHLORIDE, AND CALCIUM CHLORIDE 25 ML/HR: 600; 310; 30; 20 INJECTION, SOLUTION INTRAVENOUS at 11:54

## 2017-05-03 RX ADMIN — ONDANSETRON 4 MG: 2 INJECTION INTRAMUSCULAR; INTRAVENOUS at 12:15

## 2017-05-03 RX ADMIN — SCOLOPAMINE TRANSDERMAL SYSTEM 1.5 MG: 1 PATCH, EXTENDED RELEASE TRANSDERMAL at 12:20

## 2017-05-03 RX ADMIN — KETAMINE HYDROCHLORIDE 10 MG: 10 INJECTION, SOLUTION INTRAMUSCULAR; INTRAVENOUS at 12:54

## 2017-05-03 RX ADMIN — DEXAMETHASONE SODIUM PHOSPHATE 8 MG: 4 INJECTION, SOLUTION INTRA-ARTICULAR; INTRALESIONAL; INTRAMUSCULAR; INTRAVENOUS; SOFT TISSUE at 12:15

## 2017-05-03 NOTE — IP AVS SNAPSHOT
[address] [facility] 
[phone] Patient: Linda Loo MRN: [id number] [facility]:[birth year] You are allergic to the following Allergen Reactions Other Plant, Animal, Environmental Rash Itching Patient reports rash and itching on hands from powder in gloves Recent Documentation Height Weight BMI OB Status Smoking Status 1.676 m 124.1 kg 44.16 kg/m2 Hysterectomy Never Smoker Emergency Contacts Name Discharge Info Relation Home Work Mobile Giovanny Gary DISCHARGE CAREGIVER [3] Spouse [3] [phone] About your hospitalization You were admitted on:  May 3, 2017 You last received care in the:  [facility] PACU You were discharged on:  May 3, 2017 Why you were hospitalized Your primary diagnosis was:  Not on File Providers Seen During Your Hospitalizations Provider Role Specialty Primary office phone Mabel Randhawa MD Attending Provider Plastic Surgery [phone] Your Primary Care Physician (PCP) Primary Care Physician Office Phone Office Fax [address] [phone] Follow-up Information Follow up With Details Comments Contact Info Caty Barber MD   [address] 
[phone] Your Appointments Wednesday May 10, 2017  9:30 AM EDT  
COMPLETE PHYSICAL with Caty Barber MD  
[facility] LelaMaggie Matthewmichael [address]  
[phone] Current Discharge Medication List  
  
START taking these medications Dose & Instructions Dispensing Information Comments Morning Noon Evening Bedtime  
 cephALEXin 500 mg capsule Commonly known as:  Gala Golds Your last dose was: Your next dose is:    
   
   
 Dose:  500 mg Take 1 Cap by mouth four (4) times daily. Quantity:  20 Cap Refills:  0 HYDROcodone-acetaminophen 5-325 mg per tablet Commonly known as:  Longview Hurt Your last dose was: Your next dose is:    
   
   
 Dose:  1-2 Tab Take 1-2 Tabs by mouth every four (4) hours as needed for Pain. Max Daily Amount: 12 Tabs. Quantity:  30 Tab Refills:  0  
     
   
   
   
  
 ondansetron 8 mg disintegrating tablet Commonly known as:  ZOFRAN ODT Your last dose was: Your next dose is:    
   
   
 Dose:  8 mg Take 1 Tab by mouth every eight (8) hours as needed for Nausea. Quantity:  10 Tab Refills:  2 CONTINUE these medications which have NOT CHANGED Dose & Instructions Dispensing Information Comments Morning Noon Evening Bedtime  
 hydroCHLOROthiazide 25 mg tablet Commonly known as:  HYDRODIURIL Your last dose was: Your next dose is:    
   
   
 Dose:  25 mg Take 1 Tab by mouth daily. Indications: hypertension Quantity:  30 Tab Refills:  1 TYLENOL EXTRA STRENGTH 500 mg tablet Generic drug:  acetaminophen Your last dose was: Your next dose is:    
   
   
 Dose:  1000 mg Take 1,000 mg by mouth every six (6) hours as needed for Pain. Refills:  0 Where to Get Your Medications Information on where to get these meds will be given to you by the nurse or doctor. ! Ask your nurse or doctor about these medications  
  cephALEXin 500 mg capsule HYDROcodone-acetaminophen 5-325 mg per tablet  
 ondansetron 8 mg disintegrating tablet Discharge Instructions May remove dressings in 24hrs and shower and get incisions wet. No heavy lifting or vigorous activity. DISCHARGE SUMMARY from Nurse The following personal items are in your possession at time of discharge: 
 
Dental Appliances: None Visual Aid: Glasses, At home Home Medications: None Jewelry: None Clothing: Other (comment) (street clothes) Other Valuables: Cell Phone Personal Items Sent to Safe: declined PATIENT INSTRUCTIONS: 
 
 
F-face looks uneven A-arms unable to move or move unevenly S-speech slurred or non-existent T-time-call 911 as soon as signs and symptoms begin-DO NOT go Back to bed or wait to see if you get better-TIME IS BRAIN. Warning Signs of HEART ATTACK Call 911 if you have these symptoms: 
? Chest discomfort. Most heart attacks involve discomfort in the center of the chest that lasts more than a few minutes, or that goes away and comes back. It can feel like uncomfortable pressure, squeezing, fullness, or pain. ? Discomfort in other areas of the upper body. Symptoms can include pain or discomfort in one or both arms, the back, neck, jaw, or stomach. ? Shortness of breath with or without chest discomfort. ? Other signs may include breaking out in a cold sweat, nausea, or lightheadedness. Don't wait more than five minutes to call [address]! Fast action can save your life. Calling 911 is almost always the fastest way to get lifesaving treatment. Emergency Medical Services staff can begin treatment when they arrive  up to an hour sooner than if someone gets to the hospital by car. The discharge information has been reviewed with the {PATIENT PARENT GUARDIAN:10956}. The {PATIENT PARENT GUARDIAN:20631} verbalized understanding. Discharge medications reviewed with the {Dishcarge meds reviewed GSTT:64251} and appropriate educational materials and side effects teaching were provided. Discharge Orders None General Information Please provide this summary of care documentation to your next provider. Introducing \[facility] [place] [facility]\"" & HEALTH SERVICES! [facility] introduces [employer]at patient portal. Now you can access parts of your medical record, email your doctor's office, and request medication refills online. 1. In your internet browser, go to [email or web address] 2. Click on the First Time User? Click Here link in the Sign In box. You will see the New Member Sign Up page. 3. Enter your Medigus Access Code exactly as it appears below. You will not need to use this code after youve completed the sign-up process. If you do not sign up before the expiration date, you must request a new code. · Medigus Access Code: [email or web address] Expires: 7/11/2017  9:08 AM 
 
4. Enter the last four digits of your Social Security Number (xxxx) and Date of Birth (mm/dd/yyyy) as indicated and click Submit. You will be taken to the next sign-up page. 5. Create a Medigus ID. This will be your Medigus login ID and cannot be changed, so think of one that is secure and easy to remember. 6. Create a Medigus password. You can change your password at any time. 7. Enter your Password Reset Question and Answer. This can be used at a later time if you forget your password. 8. Enter your e-mail address. You will receive e-mail notification when new information is available in [address] 9. Click Sign Up. You can now view and download portions of your medical record. 10. Click the Download Summary menu link to download a portable copy of your medical information. If you have questions, please visit the Frequently Asked Questions section of the Medigus website. Remember, Medigus is NOT to be used for urgent needs. For medical emergencies, dial 911. Now available from your iPhone and Android! Patient Signature:  ____________________________________________________________ Date:  ____________________________________________________________  
  
Carlena Shay  Provider Signature: ____________________________________________________________ Date:  ____________________________________________________________

## 2017-05-03 NOTE — ANESTHESIA POSTPROCEDURE EVALUATION
Post-Anesthesia Evaluation and Assessment    Patient: Ange Alarcon MRN: [id number]  SSN: [id number]    YOB: 1955  Age: 63 y.o. Sex: female       Cardiovascular Function/Vital Signs  Visit Vitals    BP (!) 115/92    Pulse 88    Temp 36.5 °C (97.7 °F)    Resp 16    Ht 5' 6\" (1.676 m)    Wt 124.1 kg (273 lb 9.5 oz)    SpO2 100%    BMI 44.16 kg/m2       Patient is status post general anesthesia for Procedure(s):  EXCHANGE RIGHT BREAST TISSUE EXPANDER FOR BREAST IMPLANT . Nausea/Vomiting: None    Postoperative hydration reviewed and adequate. Pain:  Pain Scale 1: Numeric (0 - 10) (05/03/17 1103)  Pain Intensity 1: 0 (05/03/17 1103)   Managed    Neurological Status:   Neuro (WDL): Exceptions to WDL (05/03/17 1147)  Neuro  Neurologic State: Alert (05/03/17 1147)  Orientation Level: Appropriate for age;Oriented X4 (05/03/17 1147)  Cognition: Appropriate decision making (05/03/17 1147)  Speech: Clear (05/03/17 1147)  LUE Motor Response: Purposeful;Numbness;Tingling (numbness and tingling at times) (05/03/17 1147)  LLE Motor Response: Purposeful;Tingling;Numbness (numbness and tingling at times) (05/03/17 1147)  RUE Motor Response: Purposeful;Tingling;Numbness (numbness and tingling at times) (05/03/17 1147)  RLE Motor Response: Purposeful;Numbness;Tingling (numbness and tingling at times) (05/03/17 1147)   At baseline    Mental Status and Level of Consciousness: Arousable    Pulmonary Status:   O2 Device: Nasal cannula (05/03/17 1326)   Adequate oxygenation and airway patent    Complications related to anesthesia: None    Post-anesthesia assessment completed.  No concerns    Signed By: Jeanmarie Abel MD     May 3, 2017

## 2017-05-03 NOTE — IP AVS SNAPSHOT
Summary of Care Report The Summary of Care report has been created to help improve care coordination. Users with access to [employer] or [employer] [facility] (Web-based application) may access additional patient information including the Discharge Summary. If you are not currently a [address] user and need more information, please call the number listed below in the [address] section and ask to be connected with Medical Records. Facility Information Name Address Phone [address] [address] [phone] Patient Information Patient Name Sex SIMON Solorio ([id number]) Female 1955 Discharge Information Admitting Provider Service Area Unit Lala Medina MD / [address] [facility] [facility] / [phone] Discharge Provider Discharge Date/Time Discharge Disposition Destination (none) 5/3/2017 13:12 (Pending) AHR (none) Patient Language Language ENGLISH [13] Hospital Problems as of 5/3/2017  Reviewed: 5/3/2017 11:24 AM by Jeanmarie Abel MD  
 None Non-Hospital Problems as of 5/3/2017  Reviewed: 5/3/2017 11:24 AM by Jeanmarie Abel MD  
  
  
  
 Class Noted - Resolved Last Modified Active Problems Paget's disease of left breast ([facility] [address]  2016 - Present 10/7/2016 by Candy Dowling MD  
  Entered by Candy Dowling MD  
  Overview Signed 2016 11:10 AM by Candy Dowling MD  
   ?Paget's disease Morbid obesity ([facility] [address]  2017 - Present 2017 by Alyssa De León MD  
  Entered by Alyssa De León MD  
  Essential hypertension  2017 - Present 2017 by Alyssa De León MD  
  Entered by Alyssa De León MD  
  Snores  2017 - Present 2017 by Alyssa De León MD  
  Entered by Alyssa De León MD  
  
You are allergic to the following Allergen Reactions Other Plant, Animal, Environmental Rash Itching Patient reports rash and itching on hands from powder in gloves Current Discharge Medication List  
  
START taking these medications Dose & Instructions Dispensing Information Comments  
 cephALEXin 500 mg capsule Commonly known as:  Calvin Bowling Dose:  500 mg Take 1 Cap by mouth four (4) times daily. Quantity:  20 Cap Refills:  0 HYDROcodone-acetaminophen 5-325 mg per tablet Commonly known as:  Charles Nigh Dose:  1-2 Tab Take 1-2 Tabs by mouth every four (4) hours as needed for Pain. Max Daily Amount: 12 Tabs. Quantity:  30 Tab Refills:  0  
   
 ondansetron 8 mg disintegrating tablet Commonly known as:  ZOFRAN ODT Dose:  8 mg Take 1 Tab by mouth every eight (8) hours as needed for Nausea. Quantity:  10 Tab Refills:  2 CONTINUE these medications which have NOT CHANGED Dose & Instructions Dispensing Information Comments  
 hydroCHLOROthiazide 25 mg tablet Commonly known as:  HYDRODIURIL Dose:  25 mg Take 1 Tab by mouth daily. Indications: hypertension Quantity:  30 Tab Refills:  1 TYLENOL EXTRA STRENGTH 500 mg tablet Generic drug:  acetaminophen Dose:  1000 mg Take 1,000 mg by mouth every six (6) hours as needed for Pain. Refills:  0 Current Immunizations Name Date Influenza Vaccine (Quad) PF 10/7/2016 Surgery Information ID Date/Time Status Primary Surgeon All Procedures Location [id number] 5/3/2017 1230 Unposted Ayaka Galloway MD EXCHANGE RIGHT BREAST TISSUE EXPANDER FOR BREAST IMPLANT  [facility] Follow-up Information Follow up With Details Comments Contact Info Alyssa De León MD   [address] [place] [address] 
[phone] Discharge Instructions May remove dressings in 24hrs and shower and get incisions wet. No heavy lifting or vigorous activity. DISCHARGE SUMMARY from Nurse The following personal items are in your possession at time of discharge: 
 
Dental Appliances: None Visual Aid: Glasses, At home Home Medications: None Jewelry: None Clothing: Other (comment) (street clothes) Other Valuables: Cell Phone Personal Items Sent to Safe: declined PATIENT INSTRUCTIONS: 
 
 
F-face looks uneven A-arms unable to move or move unevenly S-speech slurred or non-existent T-time-call 911 as soon as signs and symptoms begin-DO NOT go Back to bed or wait to see if you get better-TIME IS BRAIN. Warning Signs of HEART ATTACK Call 911 if you have these symptoms: 
? Chest discomfort. Most heart attacks involve discomfort in the center of the chest that lasts more than a few minutes, or that goes away and comes back. It can feel like uncomfortable pressure, squeezing, fullness, or pain. ? Discomfort in other areas of the upper body. Symptoms can include pain or discomfort in one or both arms, the back, neck, jaw, or stomach. ? Shortness of breath with or without chest discomfort. ? Other signs may include breaking out in a cold sweat, nausea, or lightheadedness. Don't wait more than five minutes to call [address]! Fast action can save your life. Calling 911 is almost always the fastest way to get lifesaving treatment. Emergency Medical Services staff can begin treatment when they arrive  up to an hour sooner than if someone gets to the hospital by car. The discharge information has been reviewed with the {PATIENT PARENT GUARDIAN:98618}. The {PATIENT PARENT GUARDIAN:39018} verbalized understanding.  
 
Discharge medications reviewed with the {Dishcarge meds reviewed OZCD:15650} and appropriate educational materials and side effects teaching were provided. Chart Review Routing History Recipient Method Report Sent By Susana Carballo LPN Phone: [phone] In Basket Note Review Lisa Schroeder MD [phone] 6/16/2016  1:23 PM 06/16/2016 Nereyda Carballo LPN Phone: [phone] [place] [address] [facility] Lisa Schroeder MD [phone] 6/27/2016  8:52 AM   
 Lisa Schroeder MD  
Phone: [phone] [place] [address] [place], [place] [[id number]] 8/52/5776  2:10 PM   
 Lisa Schroeder MD  
Phone: [phone] In Basket IP Auto Routed James Baugh MD [[id number]] 10/19/2016  9:00 PM 10/19/2016 Erma Alvarenga MD  
Phone: [phone] In Basket IP Auto Routed Trans Lisa Schroeder MD [phone] 11/18/2016  2:53 PM 11/18/2016 Bryan Mccauley MD  
Phone: [phone] In Basket IP Auto Routed Trans Lisa Schroeder MD [phone] 11/18/2016  2:53 PM 11/18/2016 Lisa Schroeder MD  
Phone: [phone] In [employer] IP Auto Routed James Baugh MD [[id number]] 11/19/2016  8:23 AM 11/19/2016

## 2017-05-03 NOTE — IP AVS SNAPSHOT
Current Discharge Medication List  
  
START taking these medications Dose & Instructions Dispensing Information Comments Morning Noon Evening Bedtime  
 cephALEXin 500 mg capsule Commonly known as:  Carmencita Klein Your last dose was: Your next dose is:    
   
   
 Dose:  500 mg Take 1 Cap by mouth four (4) times daily. Quantity:  20 Cap Refills:  0 HYDROcodone-acetaminophen 5-325 mg per tablet Commonly known as:  Shannon Walker Your last dose was: Your next dose is:    
   
   
 Dose:  1-2 Tab Take 1-2 Tabs by mouth every four (4) hours as needed for Pain. Max Daily Amount: 12 Tabs. Quantity:  30 Tab Refills:  0  
     
   
   
   
  
 ondansetron 8 mg disintegrating tablet Commonly known as:  ZOFRAN ODT Your last dose was: Your next dose is:    
   
   
 Dose:  8 mg Take 1 Tab by mouth every eight (8) hours as needed for Nausea. Quantity:  10 Tab Refills:  2 CONTINUE these medications which have NOT CHANGED Dose & Instructions Dispensing Information Comments Morning Noon Evening Bedtime  
 hydroCHLOROthiazide 25 mg tablet Commonly known as:  HYDRODIURIL Your last dose was: Your next dose is:    
   
   
 Dose:  25 mg Take 1 Tab by mouth daily. Indications: hypertension Quantity:  30 Tab Refills:  1 TYLENOL EXTRA STRENGTH 500 mg tablet Generic drug:  acetaminophen Your last dose was: Your next dose is:    
   
   
 Dose:  1000 mg Take 1,000 mg by mouth every six (6) hours as needed for Pain. Refills:  0 Where to Get Your Medications Information on where to get these meds will be given to you by the nurse or doctor. ! Ask your nurse or doctor about these medications  
  cephALEXin 500 mg capsule HYDROcodone-acetaminophen 5-325 mg per tablet ondansetron 8 mg disintegrating tablet

## 2017-05-03 NOTE — PERIOP NOTES
1527:  TRANSFER - IN REPORT:    Verbal report received from Paola Garcia RN on [employer]  being received from PACU for routine progression of care      Report consisted of patients Situation, Background, Assessment and   Recommendations(SBAR). Information from the following report(s) SBAR, Kardex, OR Summary, Intake/Output and MAR was reviewed with the receiving nurse. Opportunity for questions and clarification was provided. Assessment completed upon patients arrival to unit and care assumed. 1545:  Discharge instructions and prescriptions reviewed with the patient & her . They have copies of all & verbalize understanding of all.    1555:  Patient assisted OOB with a steady gait. She is getting dressed independently & denies any pain. 1600:  IV removed.       1605:  Patient discharged from hospital with all belongings

## 2017-05-03 NOTE — BRIEF OP NOTE
BRIEF OPERATIVE NOTE    Date of Procedure: 5/3/2017   Preoperative Diagnosis: BREAST CANCER  Postoperative Diagnosis: BREAST CANCER    Procedure(s):  EXCHANGE RIGHT BREAST TISSUE EXPANDER FOR BREAST IMPLANT ([place] 800ml mod plus profile)  Surgeon(s) and Role:     * Meri Lyman MD - Primary         Assistant Staff:       Surgical Staff:  Circ-1: Angus Salas RN  Scrub Tech-1: Kallie Cheema  Surg Asst-1: Nicho Madrigal  Event Time In   Incision Start 1247   Incision Close 1308     Anesthesia: General   Estimated Blood Loss: 10ml  Specimens: * No specimens in log *   Findings: see note   Complications: none  Implants:   Implant Name Type Inv.  Item Serial No.  Lot No. LRB No. Used Action   IMPL BRST RND MP+ GEL 800ML --  - [id number]   IMPL BRST RND MP+ GEL 800ML --  [id number] [place] [id number] Implanted

## 2017-05-03 NOTE — DISCHARGE INSTRUCTIONS
Please call office tomorrow for follow-up visit in 2 weeks    May remove dressings in 24hrs and shower and get incisions wet. No heavy lifting or vigorous activity. DISCHARGE SUMMARY from Nurse    The following personal items are in your possession at time of discharge:    Dental Appliances: None  Visual Aid: Glasses, At home     Home Medications: None  Jewelry: None  Clothing: Other (comment) (street clothes)  Other Valuables: Cell Phone  Personal Items Sent to Safe: declined          PATIENT INSTRUCTIONS:    After general anesthesia or intravenous sedation, for 24 hours or while taking prescription Narcotics:  · Limit your activities  · Do not drive and operate hazardous machinery  · Do not make important personal or business decisions  · Do  not drink alcoholic beverages  · If you have not urinated within 8 hours after discharge, please contact your surgeon on call. Report the following to your surgeon:  · Excessive pain, swelling, redness or odor of or around the surgical area  · Temperature over 100.5  · Nausea and vomiting lasting longer than 4 hours or if unable to take medications  · Any signs of decreased circulation or nerve impairment to extremity: change in color, persistent  numbness, tingling, coldness or increase pain  · Any questions        What to do at Home:  Recommended activity: Activity as tolerated, Activity as tolerated    If you experience any of the following symptoms fever, drainage or excessive pain, please follow up with Dr. Nick Ngo in 2 weeks. *  Please give a list of your current medications to your Primary Care Provider. *  Please update this list whenever your medications are discontinued, doses are      changed, or new medications (including over-the-counter products) are added. *  Please carry medication information at all times in case of emergency situations.           These are general instructions for a healthy lifestyle:    No smoking/ No tobacco products/ Avoid exposure to second hand smoke    Surgeon General's Warning:  Quitting smoking now greatly reduces serious risk to your health. Obesity, smoking, and sedentary lifestyle greatly increases your risk for illness    A healthy diet, regular physical exercise & weight monitoring are important for maintaining a healthy lifestyle    You may be retaining fluid if you have a history of heart failure or if you experience any of the following symptoms:  Weight gain of 3 pounds or more overnight or 5 pounds in a week, increased swelling in our hands or feet or shortness of breath while lying flat in bed. Please call your doctor as soon as you notice any of these symptoms; do not wait until your next office visit. Recognize signs and symptoms of STROKE:    F-face looks uneven    A-arms unable to move or move unevenly    S-speech slurred or non-existent    T-time-call 911 as soon as signs and symptoms begin-DO NOT go       Back to bed or wait to see if you get better-TIME IS BRAIN. Warning Signs of HEART ATTACK     Call 911 if you have these symptoms:   Chest discomfort. Most heart attacks involve discomfort in the center of the chest that lasts more than a few minutes, or that goes away and comes back. It can feel like uncomfortable pressure, squeezing, fullness, or pain.  Discomfort in other areas of the upper body. Symptoms can include pain or discomfort in one or both arms, the back, neck, jaw, or stomach.  Shortness of breath with or without chest discomfort.  Other signs may include breaking out in a cold sweat, nausea, or lightheadedness. Don't wait more than five minutes to call 911 - MINUTES MATTER! Fast action can save your life. Calling 911 is almost always the fastest way to get lifesaving treatment. Emergency Medical Services staff can begin treatment when they arrive -- up to an hour sooner than if someone gets to the hospital by car.        The discharge information has been reviewed with the spouse. The patient and spouse verbalized understanding. Discharge medications reviewed with the patient and spouse and appropriate educational materials and side effects teaching were provided.

## 2017-05-03 NOTE — ANESTHESIA PREPROCEDURE EVALUATION
Anesthetic History     PONV          Review of Systems / Medical History  Patient summary reviewed, nursing notes reviewed and pertinent labs reviewed    Pulmonary  Within defined limits                 Neuro/Psych         Headaches     Cardiovascular    Hypertension              Exercise tolerance: >4 METS  Comments: Normal sinus rhythm    GI/Hepatic/Renal  Within defined limits              Endo/Other        Morbid obesity, arthritis and cancer     Other Findings   Comments: Breast CA           Physical Exam    Airway  Mallampati: III  TM Distance: 4 - 6 cm  Neck ROM: normal range of motion   Mouth opening: Normal     Cardiovascular  Regular rate and rhythm,  S1 and S2 normal,  no murmur, click, rub, or gallop  Rhythm: regular  Rate: normal         Dental  No notable dental hx       Pulmonary  Breath sounds clear to auscultation               Abdominal  GI exam deferred       Other Findings            Anesthetic Plan    ASA: 3  Anesthesia type: general    Monitoring Plan: BIS      Induction: Intravenous  Anesthetic plan and risks discussed with: Patient      Glidescope, TIVA with propofol and replace narcotic with ketamine

## 2017-05-03 NOTE — IP AVS SNAPSHOT
[address] [facility] 
[phone] Patient: Linda Malone MRN: [id number] FWR:[id number] You are allergic to the following Allergen Reactions Other Plant, Animal, Environmental Rash Itching Patient reports rash and itching on hands from powder in gloves Recent Documentation Height Weight BMI OB Status Smoking Status 1.676 m 124.1 kg 44.16 kg/m2 Hysterectomy Never Smoker Emergency Contacts Name Discharge Info Relation Home Work Mobile Giovanny Gary DISCHARGE CAREGIVER [3] Spouse [3] [phone] About your hospitalization You were admitted on:  May 3, 2017 You last received care in the:  [facility] PACU You were discharged on:  May 3, 2017 Unit phone number:  [phone] Why you were hospitalized Your primary diagnosis was:  Not on File Providers Seen During Your Hospitalizations Provider Role Specialty Primary office phone Valerio Yun MD Attending Provider Plastic Surgery [phone] Your Primary Care Physician (PCP) Primary Care Physician Office Phone Office Fax [address] [phone] Follow-up Information Follow up With Details Comments Contact Info Thao Dorsey MD   [address] [facility] [place] 
[phone] Your Appointments Wednesday May 10, 2017  9:30 AM EDT  
COMPLETE PHYSICAL with Thao Dorsey MD  
[facility] Internal Medicine [facility]) Maggie Rutherford [address] [place]  
[phone] Current Discharge Medication List  
  
START taking these medications Dose & Instructions Dispensing Information Comments Morning Noon Evening Bedtime  
 cephALEXin 500 mg capsule Commonly known as:  Carmencita Klein Your last dose was:     
   
Your next dose is:    
   
   
 Dose:  500 mg  
 Take 1 Cap by mouth four (4) times daily. Quantity:  20 Cap Refills:  0 HYDROcodone-acetaminophen 5-325 mg per tablet Commonly known as:  Ginny Arias Your last dose was: Your next dose is:    
   
   
 Dose:  1-2 Tab Take 1-2 Tabs by mouth every four (4) hours as needed for Pain. Max Daily Amount: 12 Tabs. Quantity:  30 Tab Refills:  0  
     
   
   
   
  
 ondansetron 8 mg disintegrating tablet Commonly known as:  ZOFRAN ODT Your last dose was: Your next dose is:    
   
   
 Dose:  8 mg Take 1 Tab by mouth every eight (8) hours as needed for Nausea. Quantity:  10 Tab Refills:  2 CONTINUE these medications which have NOT CHANGED Dose & Instructions Dispensing Information Comments Morning Noon Evening Bedtime  
 hydroCHLOROthiazide 25 mg tablet Commonly known as:  HYDRODIURIL Your last dose was: Your next dose is:    
   
   
 Dose:  25 mg Take 1 Tab by mouth daily. Indications: hypertension Quantity:  30 Tab Refills:  1 TYLENOL EXTRA STRENGTH 500 mg tablet Generic drug:  acetaminophen Your last dose was: Your next dose is:    
   
   
 Dose:  1000 mg Take 1,000 mg by mouth every six (6) hours as needed for Pain. Refills:  0 Where to Get Your Medications Information on where to get these meds will be given to you by the nurse or doctor. ! Ask your nurse or doctor about these medications  
  cephALEXin 500 mg capsule HYDROcodone-acetaminophen 5-325 mg per tablet  
 ondansetron 8 mg disintegrating tablet Discharge Instructions May remove dressings in 24hrs and shower and get incisions wet. No heavy lifting or vigorous activity. DISCHARGE SUMMARY from Nurse The following personal items are in your possession at time of discharge: 
 
Dental Appliances: None Visual Aid: Glasses, At home Home Medications: None Jewelry: None Clothing: Other (comment) (street clothes) Other Valuables: Cell Phone Personal Items Sent to Safe: declined PATIENT INSTRUCTIONS: 
 
 
F-face looks uneven A-arms unable to move or move unevenly S-speech slurred or non-existent T-time-call 911 as soon as signs and symptoms begin-DO NOT go Back to bed or wait to see if you get better-TIME IS BRAIN. Warning Signs of HEART ATTACK Call 911 if you have these symptoms: 
? Chest discomfort. Most heart attacks involve discomfort in the center of the chest that lasts more than a few minutes, or that goes away and comes back. It can feel like uncomfortable pressure, squeezing, fullness, or pain. ? Discomfort in other areas of the upper body. Symptoms can include pain or discomfort in one or both arms, the back, neck, jaw, or stomach. ? Shortness of breath with or without chest discomfort. ? Other signs may include breaking out in a cold sweat, nausea, or lightheadedness. Don't wait more than five minutes to call [address]! Fast action can save your life. Calling 911 is almost always the fastest way to get lifesaving treatment. Emergency Medical Services staff can begin treatment when they arrive  up to an hour sooner than if someone gets to the hospital by car. The discharge information has been reviewed with the {PATIENT PARENT GUARDIAN:[id number]}. The {PATIENT PARENT GUARDIAN:04295} verbalized understanding. Discharge medications reviewed with the {Dishcarge meds reviewed UYA:[id number]} and appropriate educational materials and side effects teaching were provided. Discharge Orders None Introducing [facility]! Sharlene De Souza introduces Wejo patient portal. Now you can access parts of your medical record, email your doctor's office, and request medication refills online. 1. In your internet browser, go to [email or web address] 2. Click on the First Time User? Click Here link in the Sign In box. You will see the New Member Sign Up page. 3. Enter your Wejo Access Code exactly as it appears below. You will not need to use this code after youve completed the sign-up process. If you do not sign up before the expiration date, you must request a new code. · Wejo Access Code: [email or web address] Expires: 7/11/2017  9:08 AM 
 
4. Enter the last four digits of your Social Security Number (xxxx) and Date of Birth (mm/dd/yyyy) as indicated and click Submit. You will be taken to the next sign-up page. 5. Create a Wejo ID. This will be your Wejo login ID and cannot be changed, so think of one that is secure and easy to remember. 6. Create a Wejo password. You can change your password at any time. 7. Enter your Password Reset Question and Answer. This can be used at a later time if you forget your password. 8. Enter your e-mail address. You will receive e-mail notification when new information is available in [address] 9. Click Sign Up. You can now view and download portions of your medical record. 10. Click the Download Summary menu link to download a portable copy of your medical information. If you have questions, please visit the Frequently Asked Questions section of the Wejo website. Remember, Wejo is NOT to be used for urgent needs. For medical emergencies, dial 911. Now available from your iPhone and Android! General Information Please provide this summary of care documentation to your next provider. Patient Signature:  ____________________________________________________________ Date:  ____________________________________________________________  
  
Mariah Jorge A Provider Signature:  ____________________________________________________________ Date:  ____________________________________________________________

## 2017-05-04 NOTE — OP NOTES
[address], [facility]   OP NOTE       Name:  Cris Carrizales   MR#:  [id number]   :  1955   Account #:  [de-identified]    Surgery Date:  2017   Date of Adm:  2017       PREOPERATIVE DIAGNOSES    1. Personal history of right breast cancer. 2. Acquired absence of right breast.       POSTOPERATIVE DIAGNOSES    1. Personal history of right breast cancer. 2. Acquired absence of right breast.     PROCEDURES PERFORMED: Removal of right breast tissue   expander with delayed placement of gel implant ([place] 800 mL   moderate plus profile, smooth, round gel). SURGEON: Danika Delgadillo MD    ANESTHESIA: General via laryngeal mask airway. ESTIMATED BLOOD LOSS: Less than 10 mL. SPECIMENS REMOVED: None. COMPLICATIONS: None. INDICATIONS: This 65-year-old black female presented with a history   of right breast cancer. She initially had breast conservation, including   lumpectomy with oncoplastic reconstruction. However, additional   specimens revealed diffuse disease, and she required completion   mastectomy. She was reconstructed at the time with a tissue expander   and had been expanded uneventfully. She was ready for exchange to   a permanent gel prosthesis. Risks, benefits, and alternatives were   discussed preoperatively, and she agreed to proceed. DESCRIPTION OF PROCEDURE: After informed consent was   obtained, she was marked in the preoperative holding area in the   standing position. She was then taken to the operating room, where   general anesthetic was given. The breast was infiltrated with 1%   lidocaine with epinephrine and then prepped and draped. The   inframammary scar was incised. Cautery dissection revealed the   expander, which was drained, and easily removed. The capsule was   inspected and found to be free of abnormalities. The pocket was   irrigated with 1 liter of saline and triple antibiotic solution. An 800 mL   moderate plus profile, smooth, round implant was then inserted using   the Kam funnel and no-touch technique. The incision was closed in   layers with absorbable suture. Dermabond and dry dressings were   applied. She tolerated the procedure well and was transferred to the   recovery room in good condition after extubation.         David Carson MD        / TATA   D:  05/04/2017   14:17   T:  05/04/2017   14:37   Job #:  [id number]

## 2017-05-10 ENCOUNTER — OFFICE VISIT (OUTPATIENT)
Dept: INTERNAL MEDICINE CLINIC | Age: 62
End: 2017-05-10

## 2017-05-10 VITALS
WEIGHT: 272.6 LBS | OXYGEN SATURATION: 98 % | HEART RATE: 83 BPM | RESPIRATION RATE: 18 BRPM | BODY MASS INDEX: 43.81 KG/M2 | DIASTOLIC BLOOD PRESSURE: 75 MMHG | SYSTOLIC BLOOD PRESSURE: 119 MMHG | TEMPERATURE: 96.3 F | HEIGHT: 66 IN

## 2017-05-10 DIAGNOSIS — E66.01 MORBID OBESITY, UNSPECIFIED OBESITY TYPE (HCC): ICD-10-CM

## 2017-05-10 DIAGNOSIS — Z00.00 WELL ADULT ON ROUTINE HEALTH CHECK: Primary | ICD-10-CM

## 2017-05-10 DIAGNOSIS — Z11.59 ENCOUNTER FOR HEPATITIS C SCREENING TEST FOR LOW RISK PATIENT: ICD-10-CM

## 2017-05-10 DIAGNOSIS — I10 ESSENTIAL HYPERTENSION: ICD-10-CM

## 2017-05-10 NOTE — PROGRESS NOTES
Subjective:   63 y.o. female for Well Woman Check. She is also here for follow up on BP. HCTZ was started last month. Doing well. At home BP reading has been in 120-130s/70-80s. No side effect. Regularly following up with breast surgeon. Her gyne and breast care is done elsewhere by her Ob-Gyne physician. Patient Active Problem List   Diagnosis Code    Paget's disease of left breast ([facility] C50.012    Morbid obesity ([facility] E66.01    Essential hypertension I10    Snores R06.83     Current Outpatient Prescriptions   Medication Sig Dispense Refill    cephALEXin (KEFLEX) 500 mg capsule Take 1 Cap by mouth four (4) times daily. 20 Cap 0    hydroCHLOROthiazide (HYDRODIURIL) 25 mg tablet Take 1 Tab by mouth daily. Indications: hypertension 30 Tab 1    ondansetron (ZOFRAN ODT) 8 mg disintegrating tablet Take 1 Tab by mouth every eight (8) hours as needed for Nausea. 10 Tab 2    HYDROcodone-acetaminophen (NORCO) 5-325 mg per tablet Take 1-2 Tabs by mouth every four (4) hours as needed for Pain. Max Daily Amount: 12 Tabs. 30 Tab 0    acetaminophen (TYLENOL EXTRA STRENGTH) 500 mg tablet Take 1,000 mg by mouth every six (6) hours as needed for Pain.        Allergies   Allergen Reactions    Other Plant, Animal, Environmental Rash and Itching     Patient reports rash and itching on hands from powder in gloves     Past Medical History:   Diagnosis Date    Arthritis     Breast cancer ([facility]     Rt 2016    Cancer ([facility]     right breast    Headache     Ill-defined condition     endometrosis    Nausea & vomiting     lasting 24 hours     Past Surgical History:   Procedure Laterality Date    HX BREAST LUMPECTOMY Right 10/6/2016    RIGHT BREAST CENTRAL LUMPECTOMY, RIGHT SENTINEL NODE BIOPSY, BILATERAL BREAST REDUCTION performed by Jessie Chappell MD at [facility] MAIN OR    HX BREAST RECONSTRUCTION Right 11/18/2016    BREAST RECONSTRUCTION performed by Dagmar Garcia MD at [facility] MAIN OR    HX BREAST RECONSTRUCTION Right 5/3/2017    EXCHANGE RIGHT BREAST TISSUE EXPANDER FOR BREAST IMPLANT  performed by Meri Lyman MD at [facility] MAIN OR    HX BREAST REDUCTION Bilateral 10/6/2016    BREAST REDUCTION performed by Meri Lyman MD at [facility] MAIN OR    HX GYN      surgery for endometriosis   Swati Paulino, [facility]  for bleeding and endometriosis.  HX MASTECTOMY Right 11/18/2016    RIGHT SKIN SPARING MASTECTOMY,RIGHT BREAST RECONSTRUCTION WITH TISSUE EXPANDER ALLODERM performed by Cecilia Mccray MD at [facility] MAIN OR    HX PARTIAL HYSTERECTOMY      HX TUBAL LIGATION       Family History   Problem Relation Age of Onset    Heart Disease Mother     Hypertension Mother     Diabetes Mother    Marlea Leader Mother      unknown    Breast Cancer Mother      42's    Heart Disease Father     Cancer Father      unknown    Hypertension Sister     Hypertension Brother     Hypertension Sister      Social History   Substance Use Topics    Smoking status: Never Smoker    Smokeless tobacco: Never Used    Alcohol use 0.0 oz/week     0 Standard drinks or equivalent per week      Comment: rarely        No results found for: CHOL, CHOLX, CHLST, CHOLV, HDL, LDL, DLDL, LDLC, DLDLP, TGL, TGLX, TRIGL, [id number], TRIGP, CHHD, CHHDX    Lab Results  Component Value Date/Time   GFR est AA >60 04/26/2017 10:24 AM   GFR est non-AA >60 04/26/2017 10:24 AM   Creatinine 0.77 04/26/2017 10:24 AM   BUN 12 04/26/2017 10:24 AM   Sodium 145 04/26/2017 10:24 AM   Potassium 3.7 04/26/2017 10:24 AM   Chloride 107 04/26/2017 10:24 AM   CO2 30 04/26/2017 10:24 AM      No results found for: HBA1C, QCZ4ELWY, HGBE8, DMO5WBVS, PGR5NFPX      ROS: Feeling generally well. No TIA's or unusual headaches, no dysphagia. No prolonged cough. No dyspnea or chest pain on exertion. No abdominal pain, change in bowel habits, black or bloody stools. No urinary tract symptoms. No new or unusual musculoskeletal symptoms.     Specific concerns today: none. Objective: The patient appears well, alert, oriented x 3, in no distress. Visit Vitals    /75 (BP 1 Location: Left arm, BP Patient Position: Sitting)    Pulse 83    Temp 96.3 °F (35.7 °C) (Oral)    Resp 18    Ht 5' 6\" (1.676 m)    Wt 272 lb 9.6 oz (123.7 kg)    SpO2 98%    BMI 44 kg/m2     ENT normal.  Neck supple. No adenopathy or thyromegaly. CARMEL. Lungs are clear, good air entry, no wheezes, rhonchi or rales. S1 and S2 normal, no murmurs, regular rate and rhythm. Abdomen soft without tenderness, guarding, mass or organomegaly. Extremities show no edema, normal peripheral pulses. Neurological is normal, no focal findings. Breast and Pelvic exams are deferred. Assessment/Plan:   Well Woman  lose weight, increase physical activity, follow low fat diet, follow low salt diet, routine labs ordered, call if any problems    ICD-10-CM ICD-9-CM    1. Well adult on routine health check Z00.00 V70.0 UA/M W/RFLX CULTURE, COMP      CBC WITH AUTOMATED DIFF      TSH AND FREE T4      HEMOGLOBIN A1C WITH EAG      METABOLIC PANEL, COMPREHENSIVE      LIPID PANEL   2. Essential hypertension Y09 532.4 METABOLIC PANEL, COMPREHENSIVE   3. Morbid obesity, unspecified obesity type E66.01 278.01 TSH AND FREE T4      HEMOGLOBIN A1C WITH EAG      METABOLIC PANEL, COMPREHENSIVE   4. Encounter for hepatitis C screening test for low risk patient Z11.59 V73.89 HEPATITIS C AB     Nery Barahona was seen today for complete physical.    Diagnoses and all orders for this visit:    Well adult on routine health check  -     UA/M W/RFLX CULTURE, COMP  -     CBC WITH AUTOMATED DIFF  -     TSH AND FREE T4  -     HEMOGLOBIN A1C WITH EAG  -     METABOLIC PANEL, COMPREHENSIVE  -     LIPID PANEL    Essential hypertension  -     BP well controlled. Continue HCT.   -     METABOLIC PANEL, COMPREHENSIVE    Morbid obesity, unspecified obesity type  -     TSH AND FREE T4  -     HEMOGLOBIN A1C WITH EAG  -     METABOLIC PANEL, COMPREHENSIVE    Encounter for hepatitis C screening test for low risk patient  -     HEPATITIS C AB      Follow-up Disposition:  Return in about 6 months (around 11/10/2017).   reviewed medications and side effects in detail

## 2017-05-10 NOTE — PATIENT INSTRUCTIONS
Well Visit, Women 48 to 72: Care Instructions  Your Care Instructions  Physical exams can help you stay healthy. Your doctor has checked your overall health and may have suggested ways to take good care of yourself. He or she also may have recommended tests. At home, you can help prevent illness with healthy eating, regular exercise, and other steps. Follow-up care is a key part of your treatment and safety. Be sure to make and go to all appointments, and call your doctor if you are having problems. It's also a good idea to know your test results and keep a list of the medicines you take. How can you care for yourself at home? · Reach and stay at a healthy weight. This will lower your risk for many problems, such as obesity, diabetes, heart disease, and high blood pressure. · Get at least 30 minutes of exercise on most days of the week. Walking is a good choice. You also may want to do other activities, such as running, swimming, cycling, or playing tennis or team sports. · Do not smoke. Smoking can make health problems worse. If you need help quitting, talk to your doctor about stop-smoking programs and medicines. These can increase your chances of quitting for good. · Protect your skin from too much sun. When you're outdoors from 10 a.m. to 4 p.m., stay in the shade or cover up with clothing and a hat with a wide brim. Wear sunglasses that block UV rays. Even when it's cloudy, put broad-spectrum sunscreen (SPF 30 or higher) on any exposed skin. · See a dentist one or two times a year for checkups and to have your teeth cleaned. · Wear a seat belt in the car. · Limit alcohol to 1 drink a day. Too much alcohol can cause health problems. Follow your doctor's advice about when to have certain tests. These tests can spot problems early. · Cholesterol.  Your doctor will tell you how often to have this done based on your age, family history, or other things that can increase your risk for heart attack and stroke. · Blood pressure. Have your blood pressure checked during a routine doctor visit. Your doctor will tell you how often to check your blood pressure based on your age, your blood pressure results, and other factors. · Mammogram. Ask your doctor how often you should have a mammogram, which is an X-ray of your breasts. A mammogram can spot breast cancer before it can be felt and when it is easiest to treat. · Pap test and pelvic exam. Ask your doctor how often you should have a Pap test. You may not need to have a Pap test as often as you used to. · Vision. Have your eyes checked every year or two or as often as your doctor suggests. Some experts recommend that you have yearly exams for glaucoma and other age-related eye problems starting at age 48. · Hearing. Tell your doctor if you notice any change in your hearing. You can have tests to find out how well you hear. · Diabetes. Ask your doctor whether you should have tests for diabetes. · Colon cancer. You should begin tests for colon cancer at age 48. You may have one of several tests. Your doctor will tell you how often to have tests based on your age and risk. Risks include whether you already had a precancerous polyp removed from your colon or whether your parents, sisters and brothers, or children have had colon cancer. · Thyroid disease. Talk to your doctor about whether to have your thyroid checked as part of a regular physical exam. Women have an increased chance of a thyroid problem. · Osteoporosis. You should begin tests for bone density at age 72. If you are younger than 72, ask your doctor whether you have factors that may increase your risk for this disease. You may want to have this test before age 72. · Heart attack and stroke risk. At least every 4 to 6 years, you should have your risk for heart attack and stroke assessed.  Your doctor uses factors such as your age, blood pressure, cholesterol, and whether you smoke or have diabetes to show what your risk for a heart attack or stroke is over the next 10 years. When should you call for help? Watch closely for changes in your health, and be sure to contact your doctor if you have any problems or symptoms that concern you. Where can you learn more? Go to http://lorne-michael.info/. Enter I616 in the search box to learn more about \"Well Visit, Women 50 to 72: Care Instructions. \"  Current as of: July 19, 2016  Content Version: 11.2  © 6978-8863 [employer]ONE. Care instructions adapted under license by [employer] (which disclaims liability or warranty for this information). If you have questions about a medical condition or this instruction, always ask your healthcare professional. [address] any warranty or liability for your use of this information. Starting a Weight Loss Plan: Care Instructions  Your Care Instructions  If you are thinking about losing weight, it can be hard to know where to start. Your doctor can help you set up a weight loss plan that best meets your needs. You may want to take a class on nutrition or exercise, or join a weight loss support group. If you have questions about how to make changes to your eating or exercise habits, ask your doctor about seeing a registered dietitian or an exercise specialist.  It can be a big challenge to lose weight. But you do not have to make huge changes at once. Make small changes, and stick with them. When those changes become habit, add a few more changes. If you do not think you are ready to make changes right now, try to pick a date in the future. Make an appointment to see your doctor to discuss whether the time is right for you to start a plan. Follow-up care is a key part of your treatment and safety. Be sure to make and go to all appointments, and call your doctor if you are having problems.  Its also a good idea to know your test results and keep a list of the medicines you take. How can you care for yourself at home? · Set realistic goals. Many people expect to lose much more weight than is likely. A weight loss of 5% to 10% of your body weight may be enough to improve your health. · Get family and friends involved to provide support. Talk to them about why you are trying to lose weight, and ask them to help. They can help by participating in exercise and having meals with you, even if they may be eating something different. · Find what works best for you. If you do not have time or do not like to cook, a program that offers meal replacement bars or shakes may be better for you. Or if you like to prepare meals, finding a plan that includes daily menus and recipes may be best.  · Ask your doctor about other health professionals who can help you achieve your weight loss goals. ¨ A dietitian can help you make healthy changes in your diet. ¨ An exercise specialist or  can help you develop a safe and effective exercise program.  ¨ A counselor or psychiatrist can help you cope with issues such as depression, anxiety, or family problems that can make it hard to focus on weight loss. · Consider joining a support group for people who are trying to lose weight. Your doctor can suggest groups in your area. Where can you learn more? Go to http://lorne-michael.info/. Enter G700 in the search box to learn more about \"Starting a Weight Loss Plan: Care Instructions. \"  Current as of: October 13, 2016  Content Version: 11.2  © 1645-2156 [employer]er, Incorporated. Care instructions adapted under license by [employer] (which disclaims liability or warranty for this information). If you have questions about a medical condition or this instruction, always ask your healthcare professional. [place] [address] any warranty or liability for your use of this information.

## 2017-05-10 NOTE — MR AVS SNAPSHOT
Visit Information Date & Time Provider Department Dept. Phone Encounter #  
 5/10/2017  9:30 AM Claudean Silk, MD [facility] Internal Medicine [phone] [id number] Follow-up Instructions Return in about 6 months (around 11/10/2017). Your Appointments 6/20/2017  9:20 AM  
ESTABLISHED PATIENT with Juan Ureña MD  
[facility] Pending sale to [facility] Dr[facility] Baltazar Suzannetaoronaldo [facility] ([address]) Appt Note: 6 month breast check [address] [address]  
[address] [facility], [address] [place] [address] [address] [phone] Upcoming Health Maintenance Date Due Hepatitis C Screening 1955 Pneumococcal 19-64 Highest Risk (1 of 3 - PCV13) 11/19/1976 DTaP/Tdap/Td series (1 - Tdap) 11/19/1978 INFLUENZA AGE 9 TO ADULT 8/1/2017 BREAST CANCER SCRN MAMMOGRAM 5/1/2019 PAP AKA CERVICAL CYTOLOGY 5/10/2020 COLONOSCOPY 5/19/2021 Allergies as of 5/10/2017  Review Complete On: 5/10/2017 By: Claudean Silk, MD  
  
 Severity Noted Reaction Type Reactions Other Plant, Animal, Environmental  10/06/2016    Rash, Itching Patient reports rash and itching on hands from powder in gloves Current Immunizations  Never Reviewed Name Date Influenza Vaccine (Quad) PF 10/7/2016  9:37 AM  
  
 Not reviewed this visit You Were Diagnosed With   
  
 Codes Comments Well adult on routine health check    -  Primary ICD-10-CM: Z00.00 ICD-9-CM: V70.0 Essential hypertension     ICD-10-CM: I10 
ICD-9-CM: 401.9 Morbid obesity, unspecified obesity type     ICD-10-CM: E66.01 
ICD-9-CM: 278.01 Encounter for hepatitis C screening test for low risk patient     ICD-10-CM: Z11.59 
ICD-9-CM: V73.89 Vitals BP Pulse Temp Resp Height(growth percentile) Weight(growth percentile)  119/75 (BP 1 Location: Left arm, BP Patient Position: Sitting) 83 96.3 °F (35.7 °C) (Oral) 18 5' 6\" (1.676 m) 272 lb 9.6 oz (123.7 kg) SpO2 BMI OB Status Smoking Status 98% 44 kg/m2 Hysterectomy Never Smoker Vitals History BMI and BSA Data Body Mass Index Body Surface Area 44 kg/m 2 2.4 m 2 Preferred Pharmacy Pharmacy Name Phone [phone] [address] Mary Ellen Mike [phone] Your Updated Medication List  
  
   
This list is accurate as of: 5/10/17 10:07 AM.  Always use your most recent med list.  
  
  
  
  
 cephALEXin 500 mg capsule Commonly known as:  Darletta Hopper Take 1 Cap by mouth four (4) times daily. hydroCHLOROthiazide 25 mg tablet Commonly known as:  HYDRODIURIL Take 1 Tab by mouth daily. Indications: hypertension HYDROcodone-acetaminophen 5-325 mg per tablet Commonly known as:  Linda Safe Take 1-2 Tabs by mouth every four (4) hours as needed for Pain. Max Daily Amount: 12 Tabs. ondansetron 8 mg disintegrating tablet Commonly known as:  ZOFRAN ODT Take 1 Tab by mouth every eight (8) hours as needed for Nausea. TYLENOL EXTRA STRENGTH 500 mg tablet Generic drug:  acetaminophen Take 1,000 mg by mouth every six (6) hours as needed for Pain. We Performed the Following CBC WITH AUTOMATED DIFF [87450 CPT(R)] HEMOGLOBIN A1C WITH EAG [49593 CPT(R)] HEPATITIS C AB [57346 CPT(R)] LIPID PANEL [42282 CPT(R)] METABOLIC PANEL, COMPREHENSIVE [53708 CPT(R)] TSH AND FREE T4 [11217 CPT(R)] UA/M W/RFLX CULTURE, COMP [77039 CPT(R)] Follow-up Instructions Return in about 6 months (around 11/10/2017). Patient Instructions Well Visit, Women 48 to 72: Care Instructions Your Care Instructions Physical exams can help you stay healthy. Your doctor has checked your overall health and may have suggested ways to take good care of yourself. He or she also may have recommended tests.  At home, you can help prevent illness with healthy eating, regular exercise, and other steps. Follow-up care is a key part of your treatment and safety. Be sure to make and go to all appointments, and call your doctor if you are having problems. It's also a good idea to know your test results and keep a list of the medicines you take. How can you care for yourself at home? · Reach and stay at a healthy weight. This will lower your risk for many problems, such as obesity, diabetes, heart disease, and high blood pressure. · Get at least 30 minutes of exercise on most days of the week. Walking is a good choice. You also may want to do other activities, such as running, swimming, cycling, or playing tennis or team sports. · Do not smoke. Smoking can make health problems worse. If you need help quitting, talk to your doctor about stop-smoking programs and medicines. These can increase your chances of quitting for good. · Protect your skin from too much sun. When you're outdoors from 10 a.m. to 4 p.m., stay in the shade or cover up with clothing and a hat with a wide brim. Wear sunglasses that block UV rays. Even when it's cloudy, put broad-spectrum sunscreen (SPF 30 or higher) on any exposed skin. · See a dentist one or two times a year for checkups and to have your teeth cleaned. · Wear a seat belt in the car. · Limit alcohol to 1 drink a day. Too much alcohol can cause health problems. Follow your doctor's advice about when to have certain tests. These tests can spot problems early. · Cholesterol. Your doctor will tell you how often to have this done based on your age, family history, or other things that can increase your risk for heart attack and stroke. · Blood pressure. Have your blood pressure checked during a routine doctor visit. Your doctor will tell you how often to check your blood pressure based on your age, your blood pressure results, and other factors. · Mammogram. Ask your doctor how often you should have a mammogram, which is an X-ray of your breasts. A mammogram can spot breast cancer before it can be felt and when it is easiest to treat. · Pap test and pelvic exam. Ask your doctor how often you should have a Pap test. You may not need to have a Pap test as often as you used to. · Vision. Have your eyes checked every year or two or as often as your doctor suggests. Some experts recommend that you have yearly exams for glaucoma and other age-related eye problems starting at age 48. · Hearing. Tell your doctor if you notice any change in your hearing. You can have tests to find out how well you hear. · Diabetes. Ask your doctor whether you should have tests for diabetes. · Colon cancer. You should begin tests for colon cancer at age 48. You may have one of several tests. Your doctor will tell you how often to have tests based on your age and risk. Risks include whether you already had a precancerous polyp removed from your colon or whether your parents, sisters and brothers, or children have had colon cancer. · Thyroid disease. Talk to your doctor about whether to have your thyroid checked as part of a regular physical exam. Women have an increased chance of a thyroid problem. · Osteoporosis. You should begin tests for bone density at age 72. If you are younger than 72, ask your doctor whether you have factors that may increase your risk for this disease. You may want to have this test before age 72. · Heart attack and stroke risk. At least every 4 to 6 years, you should have your risk for heart attack and stroke assessed. Your doctor uses factors such as your age, blood pressure, cholesterol, and whether you smoke or have diabetes to show what your risk for a heart attack or stroke is over the next 10 years. When should you call for help?  
Watch closely for changes in your health, and be sure to contact your doctor if you have any problems or symptoms that concern you. Where can you learn more? Go to http://lorne-michael.info/. Enter O037 in the search box to learn more about \"Well Visit, Women 50 to 72: Care Instructions. \" Current as of: July 19, 2016 Content Version: 11.2 © 6398-8189 Coresonic. Care instructions adapted under license by Ivisys (which disclaims liability or warranty for this information). If you have questions about a medical condition or this instruction, always ask your healthcare professional. [address] any warranty or liability for your use of this information. Starting a Weight Loss Plan: Care Instructions Your Care Instructions If you are thinking about losing weight, it can be hard to know where to start. Your doctor can help you set up a weight loss plan that best meets your needs. You may want to take a class on nutrition or exercise, or join a weight loss support group. If you have questions about how to make changes to your eating or exercise habits, ask your doctor about seeing a registered dietitian or an exercise specialist. 
It can be a big challenge to lose weight. But you do not have to make huge changes at once. Make small changes, and stick with them. When those changes become habit, add a few more changes. If you do not think you are ready to make changes right now, try to pick a date in the future. Make an appointment to see your doctor to discuss whether the time is right for you to start a plan. Follow-up care is a key part of your treatment and safety. Be sure to make and go to all appointments, and call your doctor if you are having problems. Its also a good idea to know your test results and keep a list of the medicines you take. How can you care for yourself at home? · Set realistic goals.  Many people expect to lose much more weight than is likely. A weight loss of 5% to 10% of your body weight may be enough to improve your health. · Get family and friends involved to provide support. Talk to them about why you are trying to lose weight, and ask them to help. They can help by participating in exercise and having meals with you, even if they may be eating something different. · Find what works best for you. If you do not have time or do not like to cook, a program that offers meal replacement bars or shakes may be better for you. Or if you like to prepare meals, finding a plan that includes daily menus and recipes may be best. 
· Ask your doctor about other health professionals who can help you achieve your weight loss goals. ¨ A dietitian can help you make healthy changes in your diet. ¨ An exercise specialist or  can help you develop a safe and effective exercise program. 
¨ A counselor or psychiatrist can help you cope with issues such as depression, anxiety, or family problems that can make it hard to focus on weight loss. · Consider joining a support group for people who are trying to lose weight. Your doctor can suggest groups in your area. Where can you learn more? Go to http://lorne-michael.info/. Enter A129 in the search box to learn more about \"Starting a Weight Loss Plan: Care Instructions. \" Current as of: October 13, 2016 Content Version: 11.2 © 1538-0231 Healthwise, Incorporated. Care instructions adapted under license by [employer] (which disclaims liability or warranty for this information). If you have questions about a medical condition or this instruction, always ask your healthcare professional. [place] [address] any warranty or liability for your use of this information. Introducing [facility]!    
 [facility] introduces Gtxh patient portal. Now you can access parts of your medical record, email your doctor's office, and request medication refills online. 1. In your internet browser, go to [email or web address] 2. Click on the First Time User? Click Here link in the Sign In box. You will see the New Member Sign Up page. 3. Enter your CorrectNet Access Code exactly as it appears below. You will not need to use this code after youve completed the sign-up process. If you do not sign up before the expiration date, you must request a new code. · CorrectNet Access Code: [email or web address] Expires: 7/11/2017  9:08 AM 
 
4. Enter the last four digits of your Social Security Number (xxxx) and Date of Birth (mm/dd/yyyy) as indicated and click Submit. You will be taken to the next sign-up page. 5. Create a CorrectNet ID. This will be your CorrectNet login ID and cannot be changed, so think of one that is secure and easy to remember. 6. Create a CorrectNet password. You can change your password at any time. 7. Enter your Password Reset Question and Answer. This can be used at a later time if you forget your password. 8. Enter your e-mail address. You will receive e-mail notification when new information is available in [address] 9. Click Sign Up. You can now view and download portions of your medical record. 10. Click the Download Summary menu link to download a portable copy of your medical information. If you have questions, please visit the Frequently Asked Questions section of the CorrectNet website. Remember, CorrectNet is NOT to be used for urgent needs. For medical emergencies, dial 911. Now available from your iPhone and Android! Please provide this summary of care documentation to your next provider. Your primary care clinician is listed as Amaury Mckinnon. If you have any questions after today's visit, please call [phone].

## 2017-05-11 LAB
ALBUMIN SERPL-MCNC: 4.3 G/DL (ref 3.5–5.5)
ALBUMIN/GLOB SERPL: 1.3 {RATIO} (ref 1.2–2.2)
ALP SERPL-CCNC: 71 IU/L (ref 39–117)
ALT SERPL-CCNC: 22 IU/L (ref 0–32)
APPEARANCE UR: CLEAR
AST SERPL-CCNC: 27 IU/L (ref 0–40)
BACTERIA #/AREA URNS HPF: NORMAL /[HPF]
BASOPHILS # BLD AUTO: 0 X10E3/UL (ref 0–0.2)
BASOPHILS NFR BLD AUTO: 1 %
BILIRUB SERPL-MCNC: 0.5 MG/DL (ref 0–1.2)
BILIRUB UR QL STRIP: NEGATIVE
BUN SERPL-MCNC: 7 MG/DL (ref 6–24)
BUN/CREAT SERPL: 11 (ref 9–23)
CALCIUM SERPL-MCNC: 9.7 MG/DL (ref 8.7–10.2)
CASTS URNS QL MICRO: NORMAL /LPF
CHLORIDE SERPL-SCNC: 97 MMOL/L (ref 96–106)
CHOLEST SERPL-MCNC: 214 MG/DL (ref 100–199)
CO2 SERPL-SCNC: 26 MMOL/L (ref 18–29)
COLOR UR: YELLOW
CREAT SERPL-MCNC: 0.63 MG/DL (ref 0.57–1)
EOSINOPHIL # BLD AUTO: 0.3 X10E3/UL (ref 0–0.4)
EOSINOPHIL NFR BLD AUTO: 6 %
EPI CELLS #/AREA URNS HPF: NORMAL /HPF
ERYTHROCYTE [DISTWIDTH] IN BLOOD BY AUTOMATED COUNT: 16.5 % (ref 12.3–15.4)
EST. AVERAGE GLUCOSE BLD GHB EST-MCNC: 148 MG/DL
GLOBULIN SER CALC-MCNC: 3.4 G/DL (ref 1.5–4.5)
GLUCOSE SERPL-MCNC: 100 MG/DL (ref 65–99)
GLUCOSE UR QL: NEGATIVE
HBA1C MFR BLD: 6.8 % (ref 4.8–5.6)
HCT VFR BLD AUTO: 42.3 % (ref 34–46.6)
HCV AB S/CO SERPL IA: 0.1 S/CO RATIO (ref 0–0.9)
HDLC SERPL-MCNC: 52 MG/DL
HGB BLD-MCNC: 13.8 G/DL (ref 11.1–15.9)
HGB UR QL STRIP: NEGATIVE
IMM GRANULOCYTES # BLD: 0 X10E3/UL (ref 0–0.1)
IMM GRANULOCYTES NFR BLD: 0 %
INTERPRETATION, 910389: NORMAL
KETONES UR QL STRIP: NEGATIVE
LDLC SERPL CALC-MCNC: 133 MG/DL (ref 0–99)
LEUKOCYTE ESTERASE UR QL STRIP: NEGATIVE
LYMPHOCYTES # BLD AUTO: 2.9 X10E3/UL (ref 0.7–3.1)
LYMPHOCYTES NFR BLD AUTO: 47 %
MCH RBC QN AUTO: 28.4 PG (ref 26.6–33)
MCHC RBC AUTO-ENTMCNC: 32.6 G/DL (ref 31.5–35.7)
MCV RBC AUTO: 87 FL (ref 79–97)
MICRO URNS: NORMAL
MICRO URNS: NORMAL
MONOCYTES # BLD AUTO: 0.4 X10E3/UL (ref 0.1–0.9)
MONOCYTES NFR BLD AUTO: 7 %
MUCOUS THREADS URNS QL MICRO: PRESENT
NEUTROPHILS # BLD AUTO: 2.4 X10E3/UL (ref 1.4–7)
NEUTROPHILS NFR BLD AUTO: 39 %
NITRITE UR QL STRIP: NEGATIVE
PH UR STRIP: 7.5 [PH] (ref 5–7.5)
PLATELET # BLD AUTO: 321 X10E3/UL (ref 150–379)
POTASSIUM SERPL-SCNC: 4.4 MMOL/L (ref 3.5–5.2)
PROT SERPL-MCNC: 7.7 G/DL (ref 6–8.5)
PROT UR QL STRIP: NEGATIVE
RBC # BLD AUTO: 4.86 X10E6/UL (ref 3.77–5.28)
RBC #/AREA URNS HPF: NORMAL /HPF
SODIUM SERPL-SCNC: 140 MMOL/L (ref 134–144)
SP GR UR: 1.01 (ref 1–1.03)
T4 FREE SERPL-MCNC: 1.06 NG/DL (ref 0.82–1.77)
TRIGL SERPL-MCNC: 145 MG/DL (ref 0–149)
TSH SERPL DL<=0.005 MIU/L-ACNC: 0.81 UIU/ML (ref 0.45–4.5)
URINALYSIS REFLEX , 377201: NORMAL
UROBILINOGEN UR STRIP-MCNC: 0.2 MG/DL (ref 0.2–1)
VLDLC SERPL CALC-MCNC: 29 MG/DL (ref 5–40)
WBC # BLD AUTO: 6.1 X10E3/UL (ref 3.4–10.8)
WBC #/AREA URNS HPF: NORMAL /HPF

## 2017-05-16 NOTE — PROGRESS NOTES
Please call patient;    1. Blood work revealed that she has diabetes. The good news is that lifestyle changes may help you get your blood sugar back to normal and avoid or delay diabetes. Will recheck this level in 3 months. If no improvement we will consider medication. 1. CBC, kidney, liver, thyroid, UA level is within normal range. 2. Cholesterol level is mildly elevated. No need to start any medication at this point. Continue watching your diet, eat healthy, less varela and fatty food, more baked or grilled or broiled food. Exercise 150 minutes/week will be helpful as well. Will recheck level in 6 months. 3. Hep C is negative.

## 2017-05-17 ENCOUNTER — TELEPHONE (OUTPATIENT)
Dept: INTERNAL MEDICINE CLINIC | Age: 62
End: 2017-05-17

## 2017-05-17 NOTE — TELEPHONE ENCOUNTER
----- Message from Panfilo Grant MD sent at 5/16/2017  1:09 PM EDT -----  Please call patient;    1. Blood work revealed that she has diabetes. The good news is that lifestyle changes may help you get your blood sugar back to normal and avoid or delay diabetes. Will recheck this level in 3 months. If no improvement we will consider medication. 1. CBC, kidney, liver, thyroid, UA level is within normal range. 2. Cholesterol level is mildly elevated. No need to start any medication at this point. Continue watching your diet, eat healthy, less varela and fatty food, more baked or grilled or broiled food. Exercise 150 minutes/week will be helpful as well. Will recheck level in 6 months. 3. Hep C is negative.

## 2017-06-16 ENCOUNTER — TELEPHONE (OUTPATIENT)
Dept: INTERNAL MEDICINE CLINIC | Age: 62
End: 2017-06-16

## 2017-06-16 DIAGNOSIS — Z98.890 HISTORY OF LUMPECTOMY OF RIGHT BREAST: ICD-10-CM

## 2017-06-16 DIAGNOSIS — Z85.3 HISTORY OF BREAST CANCER: Primary | ICD-10-CM

## 2017-06-16 NOTE — TELEPHONE ENCOUNTER
Representative called requesting referral for patient. This is for 6 month f/u from breast cancer/lumpectomy. Fax info to [phone]. Appt is on Tuesday morning.

## 2017-06-20 ENCOUNTER — OFFICE VISIT (OUTPATIENT)
Dept: SURGERY | Age: 62
End: 2017-06-20

## 2017-06-20 VITALS
TEMPERATURE: 96.9 F | SYSTOLIC BLOOD PRESSURE: 147 MMHG | RESPIRATION RATE: 18 BRPM | BODY MASS INDEX: 43.23 KG/M2 | HEART RATE: 96 BPM | DIASTOLIC BLOOD PRESSURE: 79 MMHG | HEIGHT: 66 IN | OXYGEN SATURATION: 96 % | WEIGHT: 269 LBS

## 2017-06-20 DIAGNOSIS — C50.012 PAGET'S DISEASE OF LEFT BREAST (HCC): Primary | ICD-10-CM

## 2017-06-20 NOTE — PROGRESS NOTES
Terrance Alvarez is a 63 y.o. female  Chief Complaint   Patient presents with    Breast Problem     6 mo check

## 2017-06-20 NOTE — MR AVS SNAPSHOT
Visit Information Date & Time Provider Department Dept. Phone Encounter #  
 6/20/2017  9:20 AM Blayne Villalobos MD [facility] [address] [address] [id number] Your Appointments 8/16/2017 10:00 AM  
Any with Panfilo Grant MD  
[facility] [place] [facility]) Appt Note: 3 mo follow up  
 [address]  
[phone]  
  
   
 [address] [id number]  
  
    
 11/13/2017  9:30 AM  
ROUTINE CARE with Panfilo Grant MD  
[facility]) Appt Note: 6 month follow up  
 Maggie [address] [id number]  
[phone]  
  
   
 [address] [address] Upcoming Health Maintenance Date Due Pneumococcal 19-64 Highest Risk (1 of 3 - PCV13) 11/19/1976 DTaP/Tdap/Td series (1 - Tdap) 11/19/1978 INFLUENZA AGE 9 TO ADULT 8/1/2017 BREAST CANCER SCRN MAMMOGRAM 5/1/2019 PAP AKA CERVICAL CYTOLOGY 5/10/2020 COLONOSCOPY 5/19/2021 Allergies as of 6/20/2017  Review Complete On: 6/20/2017 By: Blayne Villalobos MD  
  
 Severity Noted Reaction Type Reactions Other Plant, Animal, Environmental  10/06/2016    Rash, Itching Patient reports rash and itching on hands from powder in gloves Current Immunizations  Never Reviewed Name Date Influenza Vaccine (Quad) PF 10/7/2016  9:37 AM  
  
 Not reviewed this visit You Were Diagnosed With   
  
 Codes Comments Paget's disease of left breast ([address]    -  Primary ICD-10-CM: C50.012 ICD-9-CM: 174.0 Vitals BP Pulse Temp Resp Height(growth percentile) Weight(growth percentile) 147/79 (BP 1 Location: Right arm, BP Patient Position: Sitting) 96 96.9 °F (36.1 °C) (Oral) 18 5' 6\" (1.676 m) 269 lb (122 kg) SpO2 BMI OB Status Smoking Status 96% 43.42 kg/m2 Hysterectomy Never Smoker BMI and BSA Data Body Mass Index Body Surface Area 43.42 kg/m 2 2.38 m 2 Preferred Pharmacy Pharmacy Name Phone [phone] [phone] [place] [facility] [phone] Your Updated Medication List  
  
   
This list is accurate as of: 6/20/17 10:06 AM.  Always use your most recent med list.  
  
  
  
  
 cephALEXin 500 mg capsule Commonly known as:  Lamount Filler Take 1 Cap by mouth four (4) times daily. hydroCHLOROthiazide 25 mg tablet Commonly known as:  HYDRODIURIL Take 1 Tab by mouth daily. Indications: hypertension HYDROcodone-acetaminophen 5-325 mg per tablet Commonly known as:  Melia Staple Take 1-2 Tabs by mouth every four (4) hours as needed for Pain. Max Daily Amount: 12 Tabs. ondansetron 8 mg disintegrating tablet Commonly known as:  ZOFRAN ODT Take 1 Tab by mouth every eight (8) hours as needed for Nausea. Introducing [facility]! Francisca Goetz introduces Zwipe patient portal. Now you can access parts of your medical record, email your doctor's office, and request medication refills online. 1. In your internet browser, go to [email or web address] 2. Click on the First Time User? Click Here link in the Sign In box. You will see the New Member Sign Up page. 3. Enter your Zwipe Access Code exactly as it appears below. You will not need to use this code after youve completed the sign-up process. If you do not sign up before the expiration date, you must request a new code. · Zwipe Access Code: [email or web address] Expires: 7/11/2017  9:08 AM 
 
4. Enter the last four digits of your Social Security Number (xxxx) and Date of Birth (mm/dd/yyyy) as indicated and click Submit. You will be taken to the next sign-up page. 5. Create a Zwipe ID. This will be your Zwipe login ID and cannot be changed, so think of one that is secure and easy to remember. 6. Create a [employer] password. You can change your password at any time. 7. Enter your Password Reset Question and Answer. This can be used at a later time if you forget your password. 8. Enter your e-mail address. You will receive e-mail notification when new information is available in [address] 9. Click Sign Up. You can now view and download portions of your medical record. 10. Click the Download Summary menu link to download a portable copy of your medical information. If you have questions, please visit the Frequently Asked Questions section of the Pact website. Remember, Pact is NOT to be used for urgent needs. For medical emergencies, dial 911. Now available from your iPhone and Android! Please provide this summary of care documentation to your next provider. Your primary care clinician is listed as Amaury Mckinnon. If you have any questions after today's visit, please call [phone].

## 2017-06-20 NOTE — PROGRESS NOTES
HISTORY OF PRESENT ILLNESS  Ginny Lucero is a 63 y.o. female who comes in for reevaluation for Pagett's disease of the right nipple   Breast Problem   Pertinent negatives include no chest pain, no abdominal pain, no headaches and no shortness of breath. She has noted ulceration and bleeding from the right nipple for 2-3 years. She has not had a mammogram since 2012. She denies feeling any masses, nipple drainage, unexplained weight loss or bone pain. She has not had prior breast problems. She had menarche at 17, first of two pregnancies at 31, she had surgical menopause in her late 45s. She denies HRT usage. She denies family hx of breast or ovarian cancer. Punch bx in June 2016 demonstrated Pagett's disease. Preop breast MRI was negative except for the dermal thickening. She underwent central lumpectomy and SLNB and reduction 11/2016 and had a positive margin and ultimately underwent skin sparing mastectomy and reconstruction with contralateral symmetry by Dr Ольга Mai. She had a left screening mammogram 5/2017 which was ok.     Past Medical History:   Diagnosis Date    Arthritis     Breast cancer ([address]     Rt 2016    Cancer ([address]     right breast    Headache     Ill-defined condition     endometrosis    Nausea & vomiting     lasting 24 hours     Past Surgical History:   Procedure Laterality Date    HX BREAST LUMPECTOMY Right 10/6/2016    RIGHT BREAST CENTRAL LUMPECTOMY, RIGHT SENTINEL NODE BIOPSY, BILATERAL BREAST REDUCTION performed by Rehan Sherman MD at [facility] MAIN OR    HX BREAST RECONSTRUCTION Right 11/18/2016    BREAST RECONSTRUCTION performed by Osorio Gill MD at [facility] MAIN OR    HX BREAST RECONSTRUCTION Right 5/3/2017    EXCHANGE RIGHT BREAST TISSUE EXPANDER FOR BREAST IMPLANT  performed by Osorio Gill MD at [facility] MAIN OR    HX BREAST REDUCTION Bilateral 10/6/2016    BREAST REDUCTION performed by Osorio Gill MD at [facility] MAIN OR    HX GYN      surgery for endometriosis    HX HYSTERECTOMY      [facility]  for bleeding and endometriosis.  HX MASTECTOMY Right 11/18/2016    RIGHT SKIN SPARING MASTECTOMY,RIGHT BREAST RECONSTRUCTION WITH TISSUE EXPANDER ALLODERM performed by Nikki Jacobs MD at [facility] MAIN OR    HX PARTIAL HYSTERECTOMY      HX TUBAL LIGATION       Family History   Problem Relation Age of Onset    Heart Disease Mother     Hypertension Mother     Diabetes Mother     Cancer Mother      unknown    Breast Cancer Mother      42's    Heart Disease Father     Cancer Father      unknown    Hypertension Sister     Hypertension Brother     Hypertension Sister      Social History   Substance Use Topics    Smoking status: Never Smoker    Smokeless tobacco: Never Used    Alcohol use 0.0 oz/week     0 Standard drinks or equivalent per week      Comment: rarely     Current Outpatient Prescriptions   Medication Sig    ondansetron (ZOFRAN ODT) 8 mg disintegrating tablet Take 1 Tab by mouth every eight (8) hours as needed for Nausea.  HYDROcodone-acetaminophen (NORCO) 5-325 mg per tablet Take 1-2 Tabs by mouth every four (4) hours as needed for Pain. Max Daily Amount: 12 Tabs.  cephALEXin (KEFLEX) 500 mg capsule Take 1 Cap by mouth four (4) times daily.  hydroCHLOROthiazide (HYDRODIURIL) 25 mg tablet Take 1 Tab by mouth daily. Indications: hypertension     No current facility-administered medications for this visit. Allergies   Allergen Reactions    Other Plant, Animal, Environmental Rash and Itching     Patient reports rash and itching on hands from powder in gloves       Review of Systems   Constitutional: Negative for chills, diaphoresis, fever, malaise/fatigue and weight loss. HENT: Negative for congestion, ear pain and sore throat. Eyes: Negative for blurred vision and pain. Respiratory: Negative for cough, hemoptysis, sputum production, shortness of breath, wheezing and stridor.     Cardiovascular: Negative for chest pain, palpitations, orthopnea, claudication, leg swelling and PND. Gastrointestinal: Negative for abdominal pain, blood in stool, constipation, diarrhea, heartburn, melena, nausea and vomiting. Genitourinary: Negative for dysuria, flank pain, frequency, hematuria and urgency. Musculoskeletal: Negative for back pain, joint pain, myalgias and neck pain. Skin: Negative for itching and rash. Neurological: Negative for dizziness, tremors, focal weakness, seizures, weakness and headaches. Endo/Heme/Allergies: Negative for polydipsia. Psychiatric/Behavioral: Negative for depression and memory loss. The patient is not nervous/anxious. Visit Vitals    /79 (BP 1 Location: Right arm, BP Patient Position: Sitting)    Pulse 96    Temp 96.9 °F (36.1 °C) (Oral)    Resp 18    Ht 5' 6\" (1.676 m)    Wt 122 kg (269 lb)    SpO2 96%    BMI 43.42 kg/m2        Physical Exam   Constitutional: She is oriented to person, place, and time. She appears well-developed and well-nourished. No distress. HENT:   Head: Normocephalic and atraumatic. Mouth/Throat: Oropharynx is clear and moist. No oropharyngeal exudate. Eyes: Conjunctivae and EOM are normal. Pupils are equal, round, and reactive to light. No scleral icterus. Neck: Normal range of motion. Neck supple. No JVD present. No tracheal deviation present. No thyromegaly present. Cardiovascular: Normal rate and regular rhythm. Exam reveals no gallop and no friction rub. No murmur heard. Pulmonary/Chest: Effort normal and breath sounds normal. No respiratory distress. She has no wheezes. She has no rales. Right breast exhibits no mass, no skin change and no tenderness. Left breast exhibits no inverted nipple, no mass, no nipple discharge, no skin change and no tenderness. Breasts are asymmetrical (right implant sagging posterior laterally leaving a void in UIQ). Abdominal: Soft.  Bowel sounds are normal. She exhibits no distension and no mass. There is no tenderness. There is no rebound and no guarding. Musculoskeletal: Normal range of motion. She exhibits no edema. Lymphadenopathy:     She has no cervical adenopathy. She has no axillary adenopathy. Right: No supraclavicular adenopathy present. Left: No supraclavicular adenopathy present. Neurological: She is alert and oriented to person, place, and time. No cranial nerve deficit. Skin: Skin is warm and dry. No rash noted. She is not diaphoretic. No erythema. No pallor. Psychiatric: She has a normal mood and affect. Her behavior is normal. Judgment and thought content normal.       ASSESSMENT and PLAN  1. DCIS Reg's disease right breast.  ANG at 8 months  2. Fair symmetry    3. Morbid obesity  BMI 42.   Recommended exercise and diet modification  Left mammogram after 5/1/2018  RTC 6 months    Quirino Aldridge MD FACS

## 2017-08-16 ENCOUNTER — OFFICE VISIT (OUTPATIENT)
Dept: INTERNAL MEDICINE CLINIC | Age: 62
End: 2017-08-16

## 2017-08-16 VITALS
OXYGEN SATURATION: 98 % | DIASTOLIC BLOOD PRESSURE: 90 MMHG | BODY MASS INDEX: 42.59 KG/M2 | TEMPERATURE: 96.7 F | HEIGHT: 66 IN | HEART RATE: 64 BPM | SYSTOLIC BLOOD PRESSURE: 150 MMHG | WEIGHT: 265 LBS | RESPIRATION RATE: 18 BRPM

## 2017-08-16 DIAGNOSIS — I10 ESSENTIAL HYPERTENSION: ICD-10-CM

## 2017-08-16 DIAGNOSIS — R73.09 ELEVATED HEMOGLOBIN A1C: Primary | ICD-10-CM

## 2017-08-16 LAB — HBA1C MFR BLD HPLC: 5.8 %

## 2017-08-16 RX ORDER — HYDROCHLOROTHIAZIDE 25 MG/1
25 TABLET ORAL DAILY
Qty: 30 TAB | Refills: 6 | Status: SHIPPED | OUTPATIENT
Start: 2017-08-16 | End: 2018-05-11 | Stop reason: SDUPTHER

## 2017-08-16 NOTE — PATIENT INSTRUCTIONS
DASH Diet: Care Instructions  Your Care Instructions  The DASH diet is an eating plan that can help lower your blood pressure. DASH stands for Dietary Approaches to Stop Hypertension. Hypertension is high blood pressure. The DASH diet focuses on eating foods that are high in calcium, potassium, and magnesium. These nutrients can lower blood pressure. The foods that are highest in these nutrients are fruits, vegetables, low-fat dairy products, nuts, seeds, and legumes. But taking calcium, potassium, and magnesium supplements instead of eating foods that are high in those nutrients does not have the same effect. The DASH diet also includes whole grains, fish, and poultry. The DASH diet is one of several lifestyle changes your doctor may recommend to lower your high blood pressure. Your doctor may also want you to decrease the amount of sodium in your diet. Lowering sodium while following the DASH diet can lower blood pressure even further than just the DASH diet alone. Follow-up care is a key part of your treatment and safety. Be sure to make and go to all appointments, and call your doctor if you are having problems. It's also a good idea to know your test results and keep a list of the medicines you take. How can you care for yourself at home? Following the DASH diet  · Eat 4 to 5 servings of fruit each day. A serving is 1 medium-sized piece of fruit, ½ cup chopped or canned fruit, 1/4 cup dried fruit, or 4 ounces (½ cup) of fruit juice. Choose fruit more often than fruit juice. · Eat 4 to 5 servings of vegetables each day. A serving is 1 cup of lettuce or raw leafy vegetables, ½ cup of chopped or cooked vegetables, or 4 ounces (½ cup) of vegetable juice. Choose vegetables more often than vegetable juice. · Get 2 to 3 servings of low-fat and fat-free dairy each day. A serving is 8 ounces of milk, 1 cup of yogurt, or 1 ½ ounces of cheese. · Eat 6 to 8 servings of grains each day.  A serving is 1 slice of bread, 1 ounce of dry cereal, or ½ cup of cooked rice, pasta, or cooked cereal. Try to choose whole-grain products as much as possible. · Limit lean meat, poultry, and fish to 2 servings each day. A serving is 3 ounces, about the size of a deck of cards. · Eat 4 to 5 servings of nuts, seeds, and legumes (cooked dried beans, lentils, and split peas) each week. A serving is 1/3 cup of nuts, 2 tablespoons of seeds, or ½ cup of cooked beans or peas. · Limit fats and oils to 2 to 3 servings each day. A serving is 1 teaspoon of vegetable oil or 2 tablespoons of salad dressing. · Limit sweets and added sugars to 5 servings or less a week. A serving is 1 tablespoon jelly or jam, ½ cup sorbet, or 1 cup of lemonade. · Eat less than 2,300 milligrams (mg) of sodium a day. If you limit your sodium to 1,500 mg a day, you can lower your blood pressure even more. Tips for success  · Start small. Do not try to make dramatic changes to your diet all at once. You might feel that you are missing out on your favorite foods and then be more likely to not follow the plan. Make small changes, and stick with them. Once those changes become habit, add a few more changes. · Try some of the following:  ¨ Make it a goal to eat a fruit or vegetable at every meal and at snacks. This will make it easy to get the recommended amount of fruits and vegetables each day. ¨ Try yogurt topped with fruit and nuts for a snack or healthy dessert. ¨ Add lettuce, tomato, cucumber, and onion to sandwiches. ¨ Combine a ready-made pizza crust with low-fat mozzarella cheese and lots of vegetable toppings. Try using tomatoes, squash, spinach, broccoli, carrots, cauliflower, and onions. ¨ Have a variety of cut-up vegetables with a low-fat dip as an appetizer instead of chips and dip. ¨ Sprinkle sunflower seeds or chopped almonds over salads. Or try adding chopped walnuts or almonds to cooked vegetables. ¨ Try some vegetarian meals using beans and peas. Add garbanzo or kidney beans to salads. Make burritos and tacos with mashed cisneros beans or black beans. Where can you learn more? Go to http://lorne-michael.info/. Enter O800 in the search box to learn more about \"DASH Diet: Care Instructions. \"  Current as of: April 3, 2017  Content Version: 11.3  © 5841-0264 [employer]. Care instructions adapted under license by [employer] (which disclaims liability or warranty for this information). If you have questions about a medical condition or this instruction, always ask your healthcare professional. [address] [address] any warranty or liability for your use of this information. High Blood Pressure: Care Instructions  Your Care Instructions  If your blood pressure is usually above 140/90, you have high blood pressure, or hypertension. That means the top number is 140 or higher or the bottom number is 90 or higher, or both. Despite what a lot of people think, high blood pressure usually doesn't cause headaches or make you feel dizzy or lightheaded. It usually has no symptoms. But it does increase your risk for heart attack, stroke, and kidney or eye damage. The higher your blood pressure, the more your risk increases. Your doctor will give you a goal for your blood pressure. Your goal will be based on your health and your age. An example of a goal is to keep your blood pressure below 140/90. Lifestyle changes, such as eating healthy and being active, are always important to help lower blood pressure. You might also take medicine to reach your blood pressure goal.  Follow-up care is a key part of your treatment and safety. Be sure to make and go to all appointments, and call your doctor if you are having problems. It's also a good idea to know your test results and keep a list of the medicines you take. How can you care for yourself at home?   Medical treatment  · If you stop taking your medicine, your blood pressure will go back up. You may take one or more types of medicine to lower your blood pressure. Be safe with medicines. Take your medicine exactly as prescribed. Call your doctor if you think you are having a problem with your medicine. · Talk to your doctor before you start taking aspirin every day. Aspirin can help certain people lower their risk of a heart attack or stroke. But taking aspirin isn't right for everyone, because it can cause serious bleeding. · See your doctor regularly. You may need to see the doctor more often at first or until your blood pressure comes down. · If you are taking blood pressure medicine, talk to your doctor before you take decongestants or anti-inflammatory medicine, such as ibuprofen. Some of these medicines can raise blood pressure. · Learn how to check your blood pressure at home. Lifestyle changes  · Stay at a healthy weight. This is especially important if you put on weight around the waist. Losing even 10 pounds can help you lower your blood pressure. · If your doctor recommends it, get more exercise. Walking is a good choice. Bit by bit, increase the amount you walk every day. Try for at least 30 minutes on most days of the week. You also may want to swim, bike, or do other activities. · Avoid or limit alcohol. Talk to your doctor about whether you can drink any alcohol. · Try to limit how much sodium you eat to less than 2,300 milligrams (mg) a day. Your doctor may ask you to try to eat less than 1,500 mg a day. · Eat plenty of fruits (such as bananas and oranges), vegetables, legumes, whole grains, and low-fat dairy products. · Lower the amount of saturated fat in your diet. Saturated fat is found in animal products such as milk, cheese, and meat. Limiting these foods may help you lose weight and also lower your risk for heart disease. · Do not smoke. Smoking increases your risk for heart attack and stroke.  If you need help quitting, talk to your doctor about stop-smoking programs and medicines. These can increase your chances of quitting for good. When should you call for help? Call 911 anytime you think you may need emergency care. This may mean having symptoms that suggest that your blood pressure is causing a serious heart or blood vessel problem. Your blood pressure may be over 180/110. For example, call 911 if:  · You have symptoms of a heart attack. These may include:  ¨ Chest pain or pressure, or a strange feeling in the chest.  ¨ Sweating. ¨ Shortness of breath. ¨ Nausea or vomiting. ¨ Pain, pressure, or a strange feeling in the back, neck, jaw, or upper belly or in one or both shoulders or arms. ¨ Lightheadedness or sudden weakness. ¨ A fast or irregular heartbeat. · You have symptoms of a stroke. These may include:  ¨ Sudden numbness, tingling, weakness, or loss of movement in your face, arm, or leg, especially on only one side of your body. ¨ Sudden vision changes. ¨ Sudden trouble speaking. ¨ Sudden confusion or trouble understanding simple statements. ¨ Sudden problems with walking or balance. ¨ A sudden, severe headache that is different from past headaches. · You have severe back or belly pain. Do not wait until your blood pressure comes down on its own. Get help right away. Call your doctor now or seek immediate care if:  · Your blood pressure is much higher than normal (such as 180/110 or higher), but you don't have symptoms. · You think high blood pressure is causing symptoms, such as:  ¨ Severe headache. ¨ Blurry vision. Watch closely for changes in your health, and be sure to contact your doctor if:  · Your blood pressure measures 140/90 or higher at least 2 times. That means the top number is 140 or higher or the bottom number is 90 or higher, or both. · You think you may be having side effects from your blood pressure medicine. · Your blood pressure is usually normal, but it goes above normal at least 2 times.   Where can you learn more? Go to http://lorne-michael.info/. Enter O009 in the search box to learn more about \"High Blood Pressure: Care Instructions. \"  Current as of: August 8, 2016  Content Version: 11.3  © 1967-9307 [employer]. Care instructions adapted under license by [employer] (which disclaims liability or warranty for this information). If you have questions about a medical condition or this instruction, always ask your healthcare professional. [place] [address] any warranty or liability for your use of this information.

## 2017-08-16 NOTE — MR AVS SNAPSHOT
Visit Information Date & Time Provider Department Dept. Phone Encounter #  
 8/16/2017 10:00 AM Amaury Eid MD [facility] [phone] [id number] Follow-up Instructions Return in about 6 weeks (around 9/27/2017) for Bring BP log book. Bring the BP machine with you to compare. Ramos June Your Appointments 11/13/2017  9:30 AM  
ROUTINE CARE with Bertha Armendariz MD  
[facility] Appt Note: 6 month follow up  
 Maggie Rutherford [address] [place] [address]  
[phone]  
  
   
 [facility] [id number]  
  
    
 12/19/2017  9:20 AM  
ESTABLISHED PATIENT with Pablito Meadows MD  
[facility] Dr[facility] Giovanny Arell[facility] ([facility]) Appt Note: 6 month breast check [address] [address], [address] [address]  
[address], [address] [phone] Upcoming Health Maintenance Date Due Pneumococcal 19-64 Highest Risk (1 of 3 - PCV13) 11/19/1976 DTaP/Tdap/Td series (1 - Tdap) 11/19/1978 INFLUENZA AGE 9 TO ADULT 8/1/2017 BREAST CANCER SCRN MAMMOGRAM 5/1/2019 PAP AKA CERVICAL CYTOLOGY 5/10/2020 COLONOSCOPY 5/19/2021 Allergies as of 8/16/2017  Review Complete On: 8/16/2017 By: Bertha Armendariz MD  
  
 Severity Noted Reaction Type Reactions Other Plant, Animal, Environmental  10/06/2016    Rash, Itching Patient reports rash and itching on hands from powder in gloves Current Immunizations  Never Reviewed Name Date Influenza Vaccine (Quad) PF 10/7/2016  9:37 AM  
  
 Not reviewed this visit You Were Diagnosed With   
  
 Codes Comments Elevated hemoglobin A1c    -  Primary ICD-10-CM: R73.09 
ICD-9-CM: 790.29 Essential hypertension     ICD-10-CM: I10 
ICD-9-CM: 401.9 Vitals BP Pulse Temp Resp Height(growth percentile) Weight(growth percentile) (!) 163/91 (BP 1 Location: Left arm, BP Patient Position: Sitting) 64 96.7 °F (35.9 °C) (Oral) 18 5' 6\" (1.676 m) 265 lb (120.2 kg) SpO2 BMI OB Status Smoking Status 98% 42.77 kg/m2 Hysterectomy Never Smoker Vitals History BMI and BSA Data Body Mass Index Body Surface Area 42.77 kg/m 2 2.37 m 2 Preferred Pharmacy [facility] Phone [phone] [facility] [phone] Your Updated Medication List  
  
   
This list is accurate as of: 8/16/17 10:49 AM.  Always use your most recent med list.  
  
  
  
  
 hydroCHLOROthiazide 25 mg tablet Commonly known as:  HYDRODIURIL Take 1 Tab by mouth daily. Indications: hypertension Prescriptions Sent to Pharmacy Refills  
 hydroCHLOROthiazide (HYDRODIURIL) 25 mg tablet 6 Sig: Take 1 Tab by mouth daily. Indications: hypertension Class: Normal  
 [id number] [facility] Ph #: [phone] Route: Oral  
  
We Performed the Following AMB POC HEMOGLOBIN A1C [14298 CPT(R)] Follow-up Instructions Return in about 6 weeks (around 9/27/2017) for Bring BP log book. Bring the BP machine with you to compare. Binh Gunn Patient Instructions DASH Diet: Care Instructions Your Care Instructions The DASH diet is an eating plan that can help lower your blood pressure. DASH stands for Dietary Approaches to Stop Hypertension. Hypertension is high blood pressure. The DASH diet focuses on eating foods that are high in calcium, potassium, and magnesium. These nutrients can lower blood pressure. The foods that are highest in these nutrients are fruits, vegetables, low-fat dairy products, nuts, seeds, and legumes. But taking calcium, potassium, and magnesium supplements instead of eating foods that are high in those nutrients does not have the same effect.  The DASH diet also includes whole grains, fish, and poultry. The DASH diet is one of several lifestyle changes your doctor may recommend to lower your high blood pressure. Your doctor may also want you to decrease the amount of sodium in your diet. Lowering sodium while following the DASH diet can lower blood pressure even further than just the DASH diet alone. Follow-up care is a key part of your treatment and safety. Be sure to make and go to all appointments, and call your doctor if you are having problems. It's also a good idea to know your test results and keep a list of the medicines you take. How can you care for yourself at home? Following the DASH diet · Eat 4 to 5 servings of fruit each day. A serving is 1 medium-sized piece of fruit, ½ cup chopped or canned fruit, 1/4 cup dried fruit, or 4 ounces (½ cup) of fruit juice. Choose fruit more often than fruit juice. · Eat 4 to 5 servings of vegetables each day. A serving is 1 cup of lettuce or raw leafy vegetables, ½ cup of chopped or cooked vegetables, or 4 ounces (½ cup) of vegetable juice. Choose vegetables more often than vegetable juice. · Get 2 to 3 servings of low-fat and fat-free dairy each day. A serving is 8 ounces of milk, 1 cup of yogurt, or 1 ½ ounces of cheese. · Eat 6 to 8 servings of grains each day. A serving is 1 slice of bread, 1 ounce of dry cereal, or ½ cup of cooked rice, pasta, or cooked cereal. Try to choose whole-grain products as much as possible. · Limit lean meat, poultry, and fish to 2 servings each day. A serving is 3 ounces, about the size of a deck of cards. · Eat 4 to 5 servings of nuts, seeds, and legumes (cooked dried beans, lentils, and split peas) each week. A serving is 1/3 cup of nuts, 2 tablespoons of seeds, or ½ cup of cooked beans or peas. · Limit fats and oils to 2 to 3 servings each day. A serving is 1 teaspoon of vegetable oil or 2 tablespoons of salad dressing. · Limit sweets and added sugars to 5 servings or less a week.  A serving is 1 tablespoon jelly or jam, ½ cup sorbet, or 1 cup of lemonade. · Eat less than 2,300 milligrams (mg) of sodium a day. If you limit your sodium to 1,500 mg a day, you can lower your blood pressure even more. Tips for success · Start small. Do not try to make dramatic changes to your diet all at once. You might feel that you are missing out on your favorite foods and then be more likely to not follow the plan. Make small changes, and stick with them. Once those changes become habit, add a few more changes. · Try some of the following: ¨ Make it a goal to eat a fruit or vegetable at every meal and at snacks. This will make it easy to get the recommended amount of fruits and vegetables each day. ¨ Try yogurt topped with fruit and nuts for a snack or healthy dessert. ¨ Add lettuce, tomato, cucumber, and onion to sandwiches. ¨ Combine a ready-made pizza crust with low-fat mozzarella cheese and lots of vegetable toppings. Try using tomatoes, squash, spinach, broccoli, carrots, cauliflower, and onions. ¨ Have a variety of cut-up vegetables with a low-fat dip as an appetizer instead of chips and dip. ¨ Sprinkle sunflower seeds or chopped almonds over salads. Or try adding chopped walnuts or almonds to cooked vegetables. ¨ Try some vegetarian meals using beans and peas. Add garbanzo or kidney beans to salads. Make burritos and tacos with mashed cisneros beans or black beans. Where can you learn more? Go to http://lorne-michael.info/. Enter Q751 in the search box to learn more about \"DASH Diet: Care Instructions. \" Current as of: April 3, 2017 Content Version: 11.3 © 8140-8707 [employer]. Care instructions adapted under license by [employer] (which disclaims liability or warranty for this information).  If you have questions about a medical condition or this instruction, always ask your healthcare professional. [employer] disclaims any warranty or liability for your use of this information. High Blood Pressure: Care Instructions Your Care Instructions If your blood pressure is usually above 140/90, you have high blood pressure, or hypertension. That means the top number is 140 or higher or the bottom number is 90 or higher, or both. Despite what a lot of people think, high blood pressure usually doesn't cause headaches or make you feel dizzy or lightheaded. It usually has no symptoms. But it does increase your risk for heart attack, stroke, and kidney or eye damage. The higher your blood pressure, the more your risk increases. Your doctor will give you a goal for your blood pressure. Your goal will be based on your health and your age. An example of a goal is to keep your blood pressure below 140/90. Lifestyle changes, such as eating healthy and being active, are always important to help lower blood pressure. You might also take medicine to reach your blood pressure goal. 
Follow-up care is a key part of your treatment and safety. Be sure to make and go to all appointments, and call your doctor if you are having problems. It's also a good idea to know your test results and keep a list of the medicines you take. How can you care for yourself at home? Medical treatment · If you stop taking your medicine, your blood pressure will go back up. You may take one or more types of medicine to lower your blood pressure. Be safe with medicines. Take your medicine exactly as prescribed. Call your doctor if you think you are having a problem with your medicine. · Talk to your doctor before you start taking aspirin every day. Aspirin can help certain people lower their risk of a heart attack or stroke. But taking aspirin isn't right for everyone, because it can cause serious bleeding. · See your doctor regularly. You may need to see the doctor more often at first or until your blood pressure comes down. · If you are taking blood pressure medicine, talk to your doctor before you take decongestants or anti-inflammatory medicine, such as ibuprofen. Some of these medicines can raise blood pressure. · Learn how to check your blood pressure at home. Lifestyle changes · Stay at a healthy weight. This is especially important if you put on weight around the waist. Losing even 10 pounds can help you lower your blood pressure. · If your doctor recommends it, get more exercise. Walking is a good choice. Bit by bit, increase the amount you walk every day. Try for at least 30 minutes on most days of the week. You also may want to swim, bike, or do other activities. · Avoid or limit alcohol. Talk to your doctor about whether you can drink any alcohol. · Try to limit how much sodium you eat to less than 2,300 milligrams (mg) a day. Your doctor may ask you to try to eat less than 1,500 mg a day. · Eat plenty of fruits (such as bananas and oranges), vegetables, legumes, whole grains, and low-fat dairy products. · Lower the amount of saturated fat in your diet. Saturated fat is found in animal products such as milk, cheese, and meat. Limiting these foods may help you lose weight and also lower your risk for heart disease. · Do not smoke. Smoking increases your risk for heart attack and stroke. If you need help quitting, talk to your doctor about stop-smoking programs and medicines. These can increase your chances of quitting for good. When should you call for help? Call 911 anytime you think you may need emergency care. This may mean having symptoms that suggest that your blood pressure is causing a serious heart or blood vessel problem. Your blood pressure may be over 180/110. For example, call 911 if: 
· You have symptoms of a heart attack. These may include: ¨ Chest pain or pressure, or a strange feeling in the chest. 
¨ Sweating. ¨ Shortness of breath. ¨ Nausea or vomiting. ¨ Pain, pressure, or a strange feeling in the back, neck, jaw, or upper belly or in one or both shoulders or arms. ¨ Lightheadedness or sudden weakness. ¨ A fast or irregular heartbeat. · You have symptoms of a stroke. These may include: 
¨ Sudden numbness, tingling, weakness, or loss of movement in your face, arm, or leg, especially on only one side of your body. ¨ Sudden vision changes. ¨ Sudden trouble speaking. ¨ Sudden confusion or trouble understanding simple statements. ¨ Sudden problems with walking or balance. ¨ A sudden, severe headache that is different from past headaches. · You have severe back or belly pain. Do not wait until your blood pressure comes down on its own. Get help right away. Call your doctor now or seek immediate care if: 
· Your blood pressure is much higher than normal (such as 180/110 or higher), but you don't have symptoms. · You think high blood pressure is causing symptoms, such as: ¨ Severe headache. ¨ Blurry vision. Watch closely for changes in your health, and be sure to contact your doctor if: 
· Your blood pressure measures 140/90 or higher at least 2 times. That means the top number is 140 or higher or the bottom number is 90 or higher, or both. · You think you may be having side effects from your blood pressure medicine. · Your blood pressure is usually normal, but it goes above normal at least 2 times. Where can you learn more? Go to http://lorne-michael.info/. Enter J747 in the search box to learn more about \"High Blood Pressure: Care Instructions. \" Current as of: August 8, 2016 Content Version: 11.3 © 6942-4116 [employer]. Care instructions adapted under license by [employer] (which disclaims liability or warranty for this information).  If you have questions about a medical condition or this instruction, always ask your healthcare professional. Mariela Garg[employer] disclaims any warranty or liability for your use of this information. Introducing [facility]! Apple Jonas introduces "[employer] patient portal. Now you can access parts of your medical record, email your doctor's office, and request medication refills online. 1. In your internet browser, go to [email or web address] 2. Click on the First Time User? Click Here link in the Sign In box. You will see the New Member Sign Up page. 3. Enter your "[employer] Access Code exactly as it appears below. You will not need to use this code after youve completed the sign-up process. If you do not sign up before the expiration date, you must request a new code. · "[employer] Access Code: [email or web address] Expires: 11/14/2017 10:49 AM 
 
4. Enter the last four digits of your Social Security Number (xxxx) and Date of Birth (mm/dd/yyyy) as indicated and click Submit. You will be taken to the next sign-up page. 5. Create a "[employer] ID. This will be your "[employer] login ID and cannot be changed, so think of one that is secure and easy to remember. 6. Create a "[employer] password. You can change your password at any time. 7. Enter your Password Reset Question and Answer. This can be used at a later time if you forget your password. 8. Enter your e-mail address. You will receive e-mail notification when new information is available in [address] 9. Click Sign Up. You can now view and download portions of your medical record. 10. Click the Download Summary menu link to download a portable copy of your medical information. If you have questions, please visit the Frequently Asked Questions section of the "[employer] website. Remember, "[employer] is NOT to be used for urgent needs. For medical emergencies, dial 911. Now available from your iPhone and Android! Please provide this summary of care documentation to your next provider. Your primary care clinician is listed as Amaury Mckinnon. If you have any questions after today's visit, please call [phone].

## 2017-08-16 NOTE — PROGRESS NOTES
Subjective:     Chief Complaint   Patient presents with    High Blood Sugar     3 mo f/u        She  is a 63y.o. year old female with h/o HTN , elevated A1c of 6.8 who presents today for a follow up. Patient reports that she is not taking her blood pressure medication for the past 3 months. She assumed that she did not need to continue taking the HCTZ since there was no refill. Has been trying to be more active. Walks at least 3-4 miles 4 times/week. Denies any chest pain, soa, leg swelling. Due to some financial liabilities she was not able to see the sleep specialist yet. Pertinent items are noted in HPI.   Objective:     Vitals:    08/16/17 1018   BP: (!) 163/91   Pulse: 64   Resp: 18   Temp: 96.7 °F (35.9 °C)   TempSrc: Oral   SpO2: 98%   Weight: 265 lb (120.2 kg)   Height: 5' 6\" (1.676 m)       Physical Examination: General appearance - alert, well appearing, and in no distress, oriented to person, place, and time and overweight  Mental status - alert, oriented to person, place, and time, normal mood, behavior, speech, dress, motor activity, and thought processes  Chest - clear to auscultation, no wheezes, rales or rhonchi, symmetric air entry  Heart - normal rate, regular rhythm, normal S1, S2, no murmurs, rubs, clicks or gallops  Extremities - no pedal edema noted    Allergies   Allergen Reactions    Other Plant, Animal, Environmental Rash and Itching     Patient reports rash and itching on hands from powder in gloves      Social History     Social History    Marital status:      Spouse name: N/A    Number of children: N/A    Years of education: N/A     Social History Main Topics    Smoking status: Never Smoker    Smokeless tobacco: Never Used    Alcohol use 0.0 oz/week     0 Standard drinks or equivalent per week      Comment: rarely    Drug use: No    Sexual activity: Not Asked     Other Topics Concern    None     Social History Narrative      Family History   Problem Relation Age of Onset    Heart Disease Mother     Hypertension Mother     Diabetes Mother    Robles Bogus Cancer Mother      unknown    Breast Cancer Mother      42's    Heart Disease Father     Cancer Father      unknown    Hypertension Sister     Hypertension Brother     Hypertension Sister       Past Surgical History:   Procedure Laterality Date    HX BREAST LUMPECTOMY Right 10/6/2016    RIGHT BREAST CENTRAL LUMPECTOMY, RIGHT SENTINEL NODE BIOPSY, BILATERAL BREAST REDUCTION performed by Whitney Zaragoza MD at [facility]    HX BREAST RECONSTRUCTION Right 11/18/2016    BREAST RECONSTRUCTION performed by Bernardo Desai MD at [facility]    HX BREAST RECONSTRUCTION Right 5/3/2017    EXCHANGE RIGHT BREAST TISSUE EXPANDER FOR BREAST IMPLANT  performed by [facility]ardo Desai MD at [facility]    HX BREAST REDUCTION Bilateral 10/6/2016    BREAST REDUCTION performed by [facility]ardo [facility]ai MD at [facility]    HX GYN      surgery for endometriosis   Clerance Closs Dr. Rheba Sink, [facility]  for bleeding and endometriosis.  HX MASTECTOMY Right 11/18/2016    RIGHT SKIN SPARING MASTECTOMY,RIGHT BREAST RECONSTRUCTION WITH TISSUE EXPANDER ALLODERM performed by Whitney Zaragoza MD at [facility]    HX PARTIAL HYSTERECTOMY      HX TUBAL LIGATION        Past Medical History:   Diagnosis Date    Arthritis     Breast cancer ([facility] [address]     Rt 2016    Cancer ([facility] [address] [facility]     right breast    Headache     Ill-defined condition     endometrosis    Nausea & vomiting     lasting 24 hours      Current Outpatient Prescriptions   Medication Sig Dispense Refill    hydroCHLOROthiazide (HYDRODIURIL) 25 mg tablet Take 1 Tab by mouth daily. Indications: hypertension 30 Tab 6        Assessment/ Plan:   Diagnoses and all orders for this visit:    1. Elevated hemoglobin A1c  -     AMB POC HEMOGLOBIN A1C is 5.8, dropped down from 6.8. Advised to continue work on healthy diet, exercise at least 5 days/week.      2. Essential hypertension  -     Pt did not take med for the past three months. Advised to restart hydroCHLOROthiazide (HYDRODIURIL) 25 mg tablet; Take 1 Tab by mouth daily. Indications: hypertension           Medication risks/benefits/costs/interactions/alternatives discussed with patient. Advised patient to call back or return to office if symptoms worsen/change/persist. If patient cannot reach us or should anything more severe/urgent arise he/she should proceed directly to the nearest emergency department. Discussed expected course/resolution/complications of diagnosis in detail with patient. Patient given a written after visit summary which includes her diagnoses, current medications and vitals. Patient expressed understanding with the diagnosis and plan. Follow-up Disposition:  Return in about 6 weeks (around 9/27/2017) for Bring BP log book. Bring the BP machine with you to compare. Mary Parsno

## 2017-09-27 ENCOUNTER — OFFICE VISIT (OUTPATIENT)
Dept: INTERNAL MEDICINE CLINIC | Age: 62
End: 2017-09-27

## 2017-09-27 VITALS
WEIGHT: 263 LBS | RESPIRATION RATE: 18 BRPM | OXYGEN SATURATION: 97 % | SYSTOLIC BLOOD PRESSURE: 159 MMHG | HEIGHT: 66 IN | DIASTOLIC BLOOD PRESSURE: 85 MMHG | TEMPERATURE: 96 F | BODY MASS INDEX: 42.27 KG/M2 | HEART RATE: 76 BPM

## 2017-09-27 DIAGNOSIS — I10 ESSENTIAL HYPERTENSION: Primary | ICD-10-CM

## 2017-09-27 DIAGNOSIS — Z23 ENCOUNTER FOR IMMUNIZATION: ICD-10-CM

## 2017-09-27 DIAGNOSIS — Z91.199 NON COMPLIANCE WITH MEDICAL TREATMENT: ICD-10-CM

## 2017-09-27 NOTE — PROGRESS NOTES
Subjective:     Chief Complaint   Patient presents with    Blood Pressure Check     6 wk f/u        She  is a 63y.o. year old female with h/o HTN who presents today for 6 weeks follow up on blood pressure. She was restarted HCTZ 6 weeks ago. More info in last note. She reports that she has not been taking taking olimpia med regularly. Missed whole one week earlier this month and after that she is not taking consistently. I counted the bottle, there is still 20 tab left ( picked up on 8/302/107). Denies any cough, chest pain. Pertinent items are noted in HPI.   Objective:     Vitals:    09/27/17 1028   BP: 159/85   Pulse: 76   Resp: 18   Temp: 96 °F (35.6 °C)   TempSrc: Oral   SpO2: 97%   Weight: 263 lb (119.3 kg)   Height: 5' 6\" (1.676 m)       Physical Examination: General appearance - alert, well appearing, and in no distress, oriented to person, place, and time and overweight  Mental status - alert, oriented to person, place, and time, normal mood, behavior, speech, dress, motor activity, and thought processes  Chest - clear to auscultation, no wheezes, rales or rhonchi, symmetric air entry  Heart - normal rate, regular rhythm, normal S1, S2, no murmurs, rubs, clicks or gallops    Allergies   Allergen Reactions    Other Plant, Animal, Environmental Rash and Itching     Patient reports rash and itching on hands from powder in gloves      Social History     Social History    Marital status:      Spouse name: N/A    Number of children: N/A    Years of education: N/A     Social History Main Topics    Smoking status: Never Smoker    Smokeless tobacco: Never Used    Alcohol use 0.0 oz/week     0 Standard drinks or equivalent per week      Comment: rarely    Drug use: No    Sexual activity: Not Asked     Other Topics Concern    None     Social History Narrative      Family History   Problem Relation Age of Onset    Heart Disease Mother     Hypertension Mother     Diabetes Mother     Cancer Mother unknown    Breast Cancer Mother      42's    Heart Disease Father     Cancer Father      unknown    Hypertension Sister     Hypertension Brother     Hypertension Sister       Past Surgical History:   Procedure Laterality Date    HX BREAST LUMPECTOMY Right 10/6/2016    RIGHT BREAST CENTRAL LUMPECTOMY, RIGHT SENTINEL NODE BIOPSY, BILATERAL BREAST REDUCTION performed by Roz Kurtz MD at [facility]    HX BREAST RECONSTRUCTION Right 11/18/2016    BREAST RECONSTRUCTION performed by Monika Grewal MD at [facility]    HX BREAST RECONSTRUCTION Right 5/3/2017    EXCHANGE RIGHT BREAST TISSUE EXPANDER FOR BREAST IMPLANT  performed by [facility]rewal MD at [facility]    HX BREAST REDUCTION Bilateral 10/6/2016    BREAST REDUCTION performed by [facility] Grewal MD at [facility]    HX GYN      surgery for endometriosis   [facility] [place].  HX MASTECTOMY Right 11/18/2016    RIGHT SKIN SPARING MASTECTOMY,RIGHT BREAST RECONSTRUCTION WITH TISSUE EXPANDER ALLODERM performed by Roz Kurtz MD at [facility]    HX PARTIAL HYSTERECTOMY      HX TUBAL LIGATION        Past Medical History:   Diagnosis Date    Arthritis     Breast cancer ([facility]     Rt 2016    Cancer ([facility]     right breast    Headache     Ill-defined condition     endometrosis    Nausea & vomiting     lasting 24 hours      Current Outpatient Prescriptions   Medication Sig Dispense Refill    hydroCHLOROthiazide (HYDRODIURIL) 25 mg tablet Take 1 Tab by mouth daily. Indications: hypertension 30 Tab 6        Assessment/ Plan:   Diagnoses and all orders for this visit:    1. Essential hypertension      -  Non compliant. counseling provided. She says she will start taking the med regularly from now on.   2. Non compliance with medical treatment    3. Encounter for immunization       -  Flu vaccine provided.           Medication risks/benefits/costs/interactions/alternatives discussed with patient. Advised patient to call back or return to office if symptoms worsen/change/persist. If patient cannot reach us or should anything more severe/urgent arise he/she should proceed directly to the nearest emergency department. Discussed expected course/resolution/complications of diagnosis in detail with patient. Patient given a written after visit summary which includes her diagnoses, current medications and vitals. Patient expressed understanding with the diagnosis and plan. Follow-up Disposition:  Return in about 1 month (around 10/27/2017) for Bring BP log book. Farhan Christie

## 2017-09-27 NOTE — MR AVS SNAPSHOT
Visit Information Date & Time Provider Department Dept. Phone Encounter #  
 9/27/2017 10:00 AM Amaury Tavarez MD [facility] Internal Medicine [phone] [id number] Follow-up Instructions Return in about 1 month (around 10/27/2017) for Bring BP log book. Eddie Ripa Your Appointments 11/13/2017  9:30 AM  
ROUTINE CARE with Audrey Jones MD  
[facility] Internal Medicine [address] [facility]) Appt Note: 6 month follow up  
 Maggie Rutherford [address] [place] [address]  
[phone]  
  
   
 [facility] [id number]  
  
    
 12/19/2017  9:20 AM  
ESTABLISHED PATIENT with Lisa Kam MD  
[facility] Dr. Bal[facility] ([address] [facility]) Appt Note: 6 month breast check [address] [address], [address] [phone]  
[address] [facility], [address] [place] [address] [address] [phone] Upcoming Health Maintenance Date Due Pneumococcal 19-64 Highest Risk (1 of 3 - PCV13) 11/19/1976 DTaP/Tdap/Td series (1 - Tdap) 11/19/1978 INFLUENZA AGE 9 TO ADULT 8/1/2017 ZOSTER VACCINE AGE 60> 9/19/2017 BREAST CANCER SCRN MAMMOGRAM 5/1/2019 PAP AKA CERVICAL CYTOLOGY 5/10/2020 COLONOSCOPY 5/19/2021 Allergies as of 9/27/2017  Review Complete On: 9/27/2017 By: Chantal Dozier LPN Severity Noted Reaction Type Reactions Other Plant, Animal, Environmental  10/06/2016    Rash, Itching Patient reports rash and itching on hands from powder in gloves Current Immunizations  Reviewed on 9/27/2017 Name Date Influenza Vaccine (Quad) PF 10/7/2016  9:37 AM  
  
 Reviewed by Audrey Jones MD on 9/27/2017 at 10:43 AM  
You Were Diagnosed With   
  
 Codes Comments Essential hypertension    -  Primary ICD-10-CM: I10 
ICD-9-CM: 401.9 Encounter for immunization     ICD-10-CM: N59 ICD-9-CM: V03.89 Vitals BP Pulse Temp Resp Height(growth percentile) Weight(growth percentile) 159/85 (BP 1 Location: Left arm, BP Patient Position: Sitting) 76 96 °F (35.6 °C) (Oral) 18 5' 6\" (1.676 m) 263 lb (119.3 kg) SpO2 BMI OB Status Smoking Status 97% 42.45 kg/m2 Hysterectomy Never Smoker Vitals History BMI and BSA Data Body Mass Index Body Surface Area  
 42.45 kg/m 2 2.36 m 2 Preferred Pharmacy Pharmacy Name Phone [id number] [facility] [address] [facility] [phone] Your Updated Medication List  
  
   
This list is accurate as of: 9/27/17 10:44 AM.  Always use your most recent med list.  
  
  
  
  
 hydroCHLOROthiazide 25 mg tablet Commonly known as:  HYDRODIURIL Take 1 Tab by mouth daily. Indications: hypertension Follow-up Instructions Return in about 1 month (around 10/27/2017) for Bring BP log book. Ricardo Simms Patient Instructions DASH Diet: Care Instructions Your Care Instructions The DASH diet is an eating plan that can help lower your blood pressure. DASH stands for Dietary Approaches to Stop Hypertension. Hypertension is high blood pressure. The DASH diet focuses on eating foods that are high in calcium, potassium, and magnesium. These nutrients can lower blood pressure. The foods that are highest in these nutrients are fruits, vegetables, low-fat dairy products, nuts, seeds, and legumes. But taking calcium, potassium, and magnesium supplements instead of eating foods that are high in those nutrients does not have the same effect. The DASH diet also includes whole grains, fish, and poultry. The DASH diet is one of several lifestyle changes your doctor may recommend to lower your high blood pressure. Your doctor may also want you to decrease the amount of sodium in your diet. Lowering sodium while following the DASH diet can lower blood pressure even further than just the DASH diet alone. Follow-up care is a key part of your treatment and safety. Be sure to make and go to all appointments, and call your doctor if you are having problems. It's also a good idea to know your test results and keep a list of the medicines you take. How can you care for yourself at home? Following the DASH diet · Eat 4 to 5 servings of fruit each day. A serving is 1 medium-sized piece of fruit, ½ cup chopped or canned fruit, 1/4 cup dried fruit, or 4 ounces (½ cup) of fruit juice. Choose fruit more often than fruit juice. · Eat 4 to 5 servings of vegetables each day. A serving is 1 cup of lettuce or raw leafy vegetables, ½ cup of chopped or cooked vegetables, or 4 ounces (½ cup) of vegetable juice. Choose vegetables more often than vegetable juice. · Get 2 to 3 servings of low-fat and fat-free dairy each day. A serving is 8 ounces of milk, 1 cup of yogurt, or 1 ½ ounces of cheese. · Eat 6 to 8 servings of grains each day. A serving is 1 slice of bread, 1 ounce of dry cereal, or ½ cup of cooked rice, pasta, or cooked cereal. Try to choose whole-grain products as much as possible. · Limit lean meat, poultry, and fish to 2 servings each day. A serving is 3 ounces, about the size of a deck of cards. · Eat 4 to 5 servings of nuts, seeds, and legumes (cooked dried beans, lentils, and split peas) each week. A serving is 1/3 cup of nuts, 2 tablespoons of seeds, or ½ cup of cooked beans or peas. · Limit fats and oils to 2 to 3 servings each day. A serving is 1 teaspoon of vegetable oil or 2 tablespoons of salad dressing. · Limit sweets and added sugars to 5 servings or less a week. A serving is 1 tablespoon jelly or jam, ½ cup sorbet, or 1 cup of lemonade. · Eat less than 2,300 milligrams (mg) of sodium a day. If you limit your sodium to 1,500 mg a day, you can lower your blood pressure even more. Tips for success · Start small.  Do not try to make dramatic changes to your diet all at once. You might feel that you are missing out on your favorite foods and then be more likely to not follow the plan. Make small changes, and stick with them. Once those changes become habit, add a few more changes. · Try some of the following: ¨ Make it a goal to eat a fruit or vegetable at every meal and at snacks. This will make it easy to get the recommended amount of fruits and vegetables each day. ¨ Try yogurt topped with fruit and nuts for a snack or healthy dessert. ¨ Add lettuce, tomato, cucumber, and onion to sandwiches. ¨ Combine a ready-made pizza crust with low-fat mozzarella cheese and lots of vegetable toppings. Try using tomatoes, squash, spinach, broccoli, carrots, cauliflower, and onions. ¨ Have a variety of cut-up vegetables with a low-fat dip as an appetizer instead of chips and dip. ¨ Sprinkle sunflower seeds or chopped almonds over salads. Or try adding chopped walnuts or almonds to cooked vegetables. ¨ Try some vegetarian meals using beans and peas. Add garbanzo or kidney beans to salads. Make burritos and tacos with mashed cisneros beans or black beans. Where can you learn more? Go to http://lorne-michael.info/. Enter L816 in the search box to learn more about \"DASH Diet: Care Instructions. \" Current as of: April 3, 2017 Content Version: 11.3 © 8877-0312 [employer]. Care instructions adapted under license by [employer] (which disclaims liability or warranty for this information). If you have questions about a medical condition or this instruction, always ask your healthcare professional. [place] [address] any warranty or liability for your use of this information. Introducing [facility]! Tylor Flannery introduces [employer] patient portal. Now you can access parts of your medical record, email your doctor's office, and request medication refills online.    
 
1. In your internet browser, go to [email or web address] 2. Click on the First Time User? Click Here link in the Sign In box. You will see the New Member Sign Up page. 3. Enter your [employer] Access Code exactly as it appears below. You will not need to use this code after youve completed the sign-up process. If you do not sign up before the expiration date, you must request a new code. · [employer] Access Code: [email or web address] Expires: 11/14/2017 10:49 AM 
 
4. Enter the last four digits of your Social Security Number (xxxx) and Date of Birth (mm/dd/yyyy) as indicated and click Submit. You will be taken to the next sign-up page. 5. Create a Pockitt ID. This will be your [employer] login ID and cannot be changed, so think of one that is secure and easy to remember. 6. Create a [employer] password. You can change your password at any time. 7. Enter your Password Reset Question and Answer. This can be used at a later time if you forget your password. 8. Enter your e-mail address. You will receive e-mail notification when new information is available in [address] 9. Click Sign Up. You can now view and download portions of your medical record. 10. Click the Download Summary menu link to download a portable copy of your medical information. If you have questions, please visit the Frequently Asked Questions section of the [employer] website. Remember, [employer] is NOT to be used for urgent needs. For medical emergencies, dial 911. Now available from your iPhone and Android! Please provide this summary of care documentation to your next provider. Your primary care clinician is listed as Amaury Mckinnon. If you have any questions after today's visit, please call [phone].

## 2020-01-03 ENCOUNTER — HOSPITAL ENCOUNTER (OUTPATIENT)
Dept: MAMMOGRAPHY | Age: 65
Discharge: HOME | End: 2020-01-03
Attending: SURGERY
Payer: COMMERCIAL

## 2020-01-03 DIAGNOSIS — Z12.31 VISIT FOR SCREENING MAMMOGRAM: ICD-10-CM

## 2020-01-03 PROCEDURE — 77063 BREAST TOMOSYNTHESIS BI: CPT

## 2021-03-09 ENCOUNTER — TRANSCRIBE ORDER (OUTPATIENT)
Dept: SCHEDULING | Age: 66
End: 2021-03-09

## 2021-03-09 DIAGNOSIS — Z12.31 VISIT FOR SCREENING MAMMOGRAM: Primary | ICD-10-CM

## 2021-03-11 ENCOUNTER — TELEPHONE (OUTPATIENT)
Dept: SURGERY | Age: 66
End: 2021-03-11

## 2021-03-11 NOTE — TELEPHONE ENCOUNTER
Scheduling is calling saying that the patient's mammogram order needs to be changed to a diagnostic and of the Lt breast.

## 2021-03-15 ENCOUNTER — TRANSCRIBE ORDER (OUTPATIENT)
Dept: SCHEDULING | Age: 66
End: 2021-03-15

## 2021-03-15 DIAGNOSIS — Z12.31 VISIT FOR SCREENING MAMMOGRAM: Primary | ICD-10-CM

## 2021-04-08 ENCOUNTER — HOSPITAL ENCOUNTER (OUTPATIENT)
Dept: MAMMOGRAPHY | Age: 66
Discharge: HOME | End: 2021-04-08
Attending: SURGERY
Payer: COMMERCIAL

## 2021-04-08 DIAGNOSIS — Z12.31 VISIT FOR SCREENING MAMMOGRAM: ICD-10-CM

## 2021-04-08 PROCEDURE — 77063 BREAST TOMOSYNTHESIS BI: CPT

## 2022-03-18 PROBLEM — I10 ESSENTIAL HYPERTENSION: Status: ACTIVE | Noted: 2017-04-12

## 2022-03-19 PROBLEM — E66.01 MORBID OBESITY (HCC): Status: ACTIVE | Noted: 2017-04-12

## 2022-03-19 PROBLEM — R73.09 ELEVATED HEMOGLOBIN A1C: Status: ACTIVE | Noted: 2017-08-16

## 2022-03-19 PROBLEM — R06.83 SNORES: Status: ACTIVE | Noted: 2017-04-12

## 2022-03-19 PROBLEM — Z91.199 NON COMPLIANCE WITH MEDICAL TREATMENT: Status: ACTIVE | Noted: 2017-09-27

## 2022-04-04 ENCOUNTER — TRANSCRIBE ORDER (OUTPATIENT)
Dept: SCHEDULING | Age: 67
End: 2022-04-04

## 2022-04-04 DIAGNOSIS — Z12.31 SCREENING MAMMOGRAM FOR HIGH-RISK PATIENT: Primary | ICD-10-CM

## 2022-04-20 ENCOUNTER — HOSPITAL ENCOUNTER (OUTPATIENT)
Dept: MAMMOGRAPHY | Age: 67
Discharge: HOME | End: 2022-04-20
Attending: SURGERY
Payer: COMMERCIAL

## 2022-04-20 DIAGNOSIS — Z12.31 SCREENING MAMMOGRAM FOR HIGH-RISK PATIENT: ICD-10-CM

## 2022-04-20 PROCEDURE — 77063 BREAST TOMOSYNTHESIS BI: CPT

## 2022-04-25 ENCOUNTER — HOSPITAL ENCOUNTER (OUTPATIENT)
Dept: PREADMISSION TESTING | Age: 67
Discharge: HOME | End: 2022-04-25
Attending: ORTHOPAEDIC SURGERY
Payer: COMMERCIAL

## 2022-04-25 VITALS
BODY MASS INDEX: 40.59 KG/M2 | HEIGHT: 65 IN | WEIGHT: 243.61 LBS | RESPIRATION RATE: 16 BRPM | TEMPERATURE: 97.9 F | DIASTOLIC BLOOD PRESSURE: 75 MMHG | HEART RATE: 62 BPM | SYSTOLIC BLOOD PRESSURE: 128 MMHG | OXYGEN SATURATION: 99 %

## 2022-04-25 LAB
ABO + RH BLD: NORMAL
ALBUMIN SERPL-MCNC: 3.7 G/DL (ref 3.5–5)
ALBUMIN/GLOB SERPL: 0.9 {RATIO} (ref 1.1–2.2)
ALP SERPL-CCNC: 58 U/L (ref 45–117)
ALT SERPL-CCNC: 28 U/L (ref 12–78)
ANION GAP SERPL CALC-SCNC: 5 MMOL/L (ref 5–15)
APPEARANCE UR: CLEAR
AST SERPL-CCNC: 24 U/L (ref 15–37)
BACTERIA URNS QL MICRO: NEGATIVE /HPF
BILIRUB SERPL-MCNC: 0.4 MG/DL (ref 0.2–1)
BILIRUB UR QL: NEGATIVE
BLOOD GROUP ANTIBODIES SERPL: NORMAL
BUN SERPL-MCNC: 11 MG/DL (ref 6–20)
BUN/CREAT SERPL: 15 (ref 12–20)
CALCIUM SERPL-MCNC: 9.2 MG/DL (ref 8.5–10.1)
CHLORIDE SERPL-SCNC: 107 MMOL/L (ref 97–108)
CO2 SERPL-SCNC: 28 MMOL/L (ref 21–32)
COLOR UR: ABNORMAL
CREAT SERPL-MCNC: 0.71 MG/DL (ref 0.55–1.02)
EPITH CASTS URNS QL MICRO: ABNORMAL /LPF
ERYTHROCYTE [DISTWIDTH] IN BLOOD BY AUTOMATED COUNT: 13.6 % (ref 11.5–14.5)
EST. AVERAGE GLUCOSE BLD GHB EST-MCNC: 128 MG/DL
GLOBULIN SER CALC-MCNC: 4 G/DL (ref 2–4)
GLUCOSE SERPL-MCNC: 103 MG/DL (ref 65–100)
GLUCOSE UR STRIP.AUTO-MCNC: NEGATIVE MG/DL
HBA1C MFR BLD: 6.1 % (ref 4–5.6)
HCT VFR BLD AUTO: 41.3 % (ref 35–47)
HGB BLD-MCNC: 13.6 G/DL (ref 11.5–16)
HGB UR QL STRIP: ABNORMAL
INR PPP: 1 (ref 0.9–1.1)
KETONES UR QL STRIP.AUTO: NEGATIVE MG/DL
LEUKOCYTE ESTERASE UR QL STRIP.AUTO: NEGATIVE
MCH RBC QN AUTO: 31.1 PG (ref 26–34)
MCHC RBC AUTO-ENTMCNC: 32.9 G/DL (ref 30–36.5)
MCV RBC AUTO: 94.5 FL (ref 80–99)
NITRITE UR QL STRIP.AUTO: NEGATIVE
NRBC # BLD: 0 K/UL (ref 0–0.01)
NRBC BLD-RTO: 0 PER 100 WBC
PH UR STRIP: 6.5 [PH] (ref 5–8)
PLATELET # BLD AUTO: 225 K/UL (ref 150–400)
PMV BLD AUTO: 8.9 FL (ref 8.9–12.9)
POTASSIUM SERPL-SCNC: 3.7 MMOL/L (ref 3.5–5.1)
PROT SERPL-MCNC: 7.7 G/DL (ref 6.4–8.2)
PROT UR STRIP-MCNC: NEGATIVE MG/DL
PROTHROMBIN TIME: 10 SEC (ref 9–11.1)
RBC # BLD AUTO: 4.37 M/UL (ref 3.8–5.2)
RBC #/AREA URNS HPF: ABNORMAL /HPF (ref 0–5)
SODIUM SERPL-SCNC: 140 MMOL/L (ref 136–145)
SP GR UR REFRACTOMETRY: 1.01 (ref 1–1.03)
SPECIMEN EXP DATE BLD: NORMAL
UA: UC IF INDICATED,UAUC: ABNORMAL
UROBILINOGEN UR QL STRIP.AUTO: 0.2 EU/DL (ref 0.2–1)
WBC # BLD AUTO: 4.6 K/UL (ref 3.6–11)
WBC URNS QL MICRO: ABNORMAL /HPF (ref 0–4)

## 2022-04-25 PROCEDURE — 85610 PROTHROMBIN TIME: CPT

## 2022-04-25 PROCEDURE — 83036 HEMOGLOBIN GLYCOSYLATED A1C: CPT

## 2022-04-25 PROCEDURE — 86900 BLOOD TYPING SEROLOGIC ABO: CPT

## 2022-04-25 PROCEDURE — 85027 COMPLETE CBC AUTOMATED: CPT

## 2022-04-25 PROCEDURE — 81001 URINALYSIS AUTO W/SCOPE: CPT

## 2022-04-25 PROCEDURE — 80053 COMPREHEN METABOLIC PANEL: CPT

## 2022-04-25 PROCEDURE — 36415 COLL VENOUS BLD VENIPUNCTURE: CPT

## 2022-04-25 RX ORDER — LOSARTAN POTASSIUM AND HYDROCHLOROTHIAZIDE 12.5; 5 MG/1; MG/1
1 TABLET ORAL DAILY
COMMUNITY

## 2022-04-25 RX ORDER — LOPERAMIDE HCL 2 MG
2 TABLET ORAL
COMMUNITY

## 2022-04-25 RX ORDER — ACETAMINOPHEN 500 MG
1000 TABLET ORAL
COMMUNITY

## 2022-04-25 NOTE — PERIOP NOTES
Hibiclens/Chlorhexidine    Preventing Infections Before and After - Your Surgery    IMPORTANT INSTRUCTIONS    Please read and follow these instructions carefully. If you are unable to comply with the below instructions your procedure will be cancelled. Every Night for Three (3) nights before your surgery:  1. Shower with an antibacterial soap, such as Dial, or the soap provided at your preassessment appointment. A shower is better than a bath for cleaning your skin. 2. If needed, ask someone to help you reach all areas of your body. Dont forget to clean your belly button with every shower. The night before your surgery: If you lose your Hibiclens/chlorhexidine please contact surgery center or you can purchase it at a local pharmacy  1. On the night before your surgery, shower with an antibacterial soap, such as Dial, or the soap provided at your preassessment appointment. 2. With one packet of Hibiclens/Chlorhexidine in hand, turn water off.  3. Apply Hibiclens antiseptic skin cleanser with a clean, freshly washed washcloth. ? Gently apply to your body from chin to toes (except the genital area) and especially the area(s) where your incision(s) will be. ? Leave Hibiclens/Chlorhexidine on your skin for at least 20 seconds. CAUTION: If needed, Hibiclens/chlorhexidine may be used to clean the folds of skin of the legs (such as in the area of the groin) and on your buttocks and hips. However, do not use Hibiclens/Chlorhexidine above the neck or in the genital area (your bottom) or put inside any area of your body. 4. Turn the water back on and rinse. 5. Dry gently with a clean, freshly washed towel. 6. After your shower, do not use any powder, deodorant, perfumes or lotion. 7. Use clean, freshly washed towels and washcloths every time you shower. 8. Wear clean, freshly washed pajamas to bed the night before surgery. 9. Sleep on clean, freshly washed sheets.   10. Do not allow pets to sleep in your bed with you. The Morning of your surgery:  1. Shower again thoroughly with an antibacterial soap, such as Dial or the soap provided at your preassessment appointment. If needed, ask someone for help to reach all areas of your body. Dont forget to clean your belly button! Rinse. 2. Dry gently with a clean, freshly washed towel. 3. After your shower, do not use any powder, deodorant, perfumes or lotion prior to surgery. 4. Put on clean, freshly washed clothing. Tips to help prevent infections after your surgery:  1. Protect your surgical wound from germs:  ? Hand washing is the most important thing you and your caregivers can do to prevent infections. ? Keep your bandage clean and dry! ? Do not touch your surgical wound. 2. Use clean, freshly washed towels and washcloths every time you shower; do not share bath linens with others. 3. Until your surgical wound is healed, wear clothing and sleep on bed linens each day that are clean and freshly washed. 4. Do not allow pets to sleep in your bed with you or touch your surgical wound. 5. Do not smoke - smoking delays wound healing. This may be a good time to stop smoking. 6. If you have diabetes, it is important for you to manage your blood sugar levels properly before your surgery as well as after your surgery. Poorly managed blood sugar levels slow down wound healing and prevent you from healing completely. If you lose your Hibiclens/chlorhexidine, please call the [facility], or it is available for purchase at your pharmacy.                ___________________      ___________________      ________________  (Signature of Patient)          (Witness)                   (Date and Time)

## 2022-04-25 NOTE — PERIOP NOTES
[facility]  Joint/Spine Preoperative Instructions        Surgery Date 5/2/22          Time of Arrival to be called @ [phone]    1. On the day of your surgery, please report to the Surgical Services Registration Desk and sign in at your designated time. The Surgery Center is located to the right of the Emergency Room. 2. You must have someone with you to drive you home. You should not drive a car for 24 hours following surgery. Please make arrangements for a friend or family member to stay with you for the first 24 hours after your surgery. 3. No food after midnight . Medications morning of surgery should be taken with a sip of water. Please follow pre-surgery drink instructions that were given at your Pre Admission Testing appointment. 4. We recommend you do not drink any alcoholic beverages for 24 hours before and after your surgery. 5. Contact your surgeons office for instructions on the following medications: non-steroidal anti-inflammatory drugs (i.e. Advil, Aleve), vitamins, and supplements. (Some surgeons will want you to stop these medications prior to surgery and others may allow you to take them)  **If you are currently taking Plavix, Coumadin, Aspirin and/or other blood-thinning agents, contact your surgeon for instructions. ** Your surgeon will partner with the physician prescribing these medications to determine if it is safe to stop or if you need to continue taking. Please do not stop taking these medications without instructions from your surgeon    6. Wear comfortable clothes. Wear glasses instead of contacts. Do not bring any money or jewelry. Please bring picture ID, insurance card, and any prearranged co-payment or hospital payment. Do not wear make-up, particularly mascara the morning of your surgery. Do not wear nail polish, particularly if you are having foot /hand surgery.   Wear your hair loose or down, no ponytails, buns, alex pins or clips. All body piercings must be removed. Please shower with antibacterial soap for three consecutive days before and on the morning of surgery, but do not apply any lotions, powders or deodorants after the shower on the day of surgery. Please use a fresh towels after each shower. Please sleep in clean clothes and change bed linens the night before surgery. Please do not shave for 48 hours prior to surgery. Shaving of the face is acceptable. 7. You should understand that if you do not follow these instructions your surgery may be cancelled. If your physical condition changes (I.e. fever, cold or flu) please contact your surgeon as soon as possible. 8. It is important that you be on time. If a situation occurs where you may be late, please call [phone] (OR Holding Area). 9. If you have any questions and or problems, please call [phone] (Pre-admission Testing). 10. Your surgery time may be subject to change. You will receive a phone call the evening prior if your time changes. 11.  If having outpatient surgery, you must have someone to drive you here, stay with you during the duration of your stay, and to drive you home at time of discharge. 12. The following link is for the educational video for patients and/or families. http://martin-martin.org/. com/locations/kqdsuyhdo-ukshwic-kgkujxw/[place]/educational-materials    13  Due to current COVID restrictions, only two adults may accompany you the day of the procedure. We have limited seating available. If our waiting room is at capacity, your ride may be asked to remain in their vehicle. No children are allowed in the waiting room    Special Instructions:       TAKE ALL MEDICATIONS THE DAY OF SURGERY EXCEPT:no tylenol      I understand a pre-operative phone call will be made to verify my surgery time.   In the event that I am not available, I give permission for a message to be left on my answering service and/or with another person?   yes         ___________________      __________   _________    (Signature of Patient)             (Witness)                (Date and Time)

## 2022-04-25 NOTE — PROGRESS NOTES
Patient attended the Joint Replacement Education Class at [facility]. The content of the class was presented using a power point presentation specific for patients undergoing hip and knee replacement surgery. The [facility] Joint Replacement Education Handbook was given to the patient. Preparing for surgery, day of surgery routine and expectations, discharge process and help at home expectations, nutrition,medications, infection control, pain management, DVT prevention, ice therapy and safety were reviewed in class. Opportunity for questions provided, patient verbalized understanding of instructions.

## 2022-04-25 NOTE — PERIOP NOTES
Faxed sleep medicine consult form for evaluation. Patients stop bang score- 6. Fax confirmed. EKG done 4/20/22, CXR done 4/20/22.  Copy of results on chart from [facility].

## 2022-04-25 NOTE — PERIOP NOTES
Incentive Spirometer        Using the incentive spirometer helps expand the small air sacs of your lungs, helps you breathe deeply, and helps improve your lung function. Use your incentive spirometer twice a day (10 breaths each time) prior to surgery. How to Use Your Incentive Spirometer:  1. Hold the incentive spirometer in an upright position. 2. Breathe out as usual.   3. Place the mouthpiece in your mouth and seal your lips tightly around it. 4. Take a deep breath. Breathe in slowly and as deeply as possible. Keep the blue flow rate guide between the arrows. 5. Hold your breath as long as possible. Then exhale slowly and allow the piston to fall to the bottom of the column. 6. Rest for a few seconds and repeat steps one through five at least 10 times. PAT Tidal Volume___2250_______________  x_2_______________  Date__4/25/22_____________________    Jaiden Lyn THE INCENTIVE SPIROMETER WITH YOU TO THE HOSPITAL ON THE DAY OF YOUR SURGERY. Opportunity given to ask and answer questions as well as to observe return demonstration.     Patient signature_____________________________    Witness____________________________

## 2022-04-25 NOTE — PERIOP NOTES
Orthopedic and Spine Patients: Instructions on When You Can   Eat or Drink Before Surgery      You have been provided 2 pre-surgery drinks received at your pre-admission testing appointment.  Night before surgery:  o You should drink one bottle of the  pre-surgery drink at bedtime. No food after midnight!  Day of Surgery:  o Complete 2nd bottle of the pre-surgery drink 1 hour prior to arrival at hospital.  For questions call Pre-Admission Testing at [phone]. They are available from 8:00am-5:00pm, Monday through Friday.

## 2022-04-26 LAB
BACTERIA SPEC CULT: NORMAL
BACTERIA SPEC CULT: NORMAL
SERVICE CMNT-IMP: NORMAL

## 2022-04-26 NOTE — ADVANCED PRACTICE NURSE
PAT Nurse Practitioner   Pre-Operative Chart Review/Assessment:-ORTHOPEDIC/NEUROSURGICAL SPINE                Patient Name:  Dannie Riddle                                                           Age:   61 y.o.    :  1955     Today's Date:  2022     Date of PAT:   22     Date of Surgery:    2022      Procedure(s):  Left  Total Knee Arthroplasty     Surgeon:   Sarah Bear     Medical Clearance:  [facility]                    PLAN:      1)  Cardiac Clearance:  Not requested-EKG at [facility]       2)  Program for Diabetes Health Consult:  Not indicated-A1C 6.1      3)  Sleep Apnea evaluation:   At risk for SHAHID. STOP BANG Score 6;  Snoring-Admits, Apnea-Admits.      Referral sent to [facility]            4) Treatment for MRSA/Staph Aureus:  Negative       5) Additional Concerns:  BMI 40.54 (39.54 22 in [facility] office), at risk for SHAHID, PONV, hx of breast CA (DCIS Reg's disease), hx of COVID 3/2022                Vital Signs:         Visit Vitals  /75 (BP 1 Location: Right upper arm, BP Patient Position: At rest;Sitting)   Pulse 62   Temp 97.9 °F (36.6 °C)   Resp 16   Ht 5' 5\" (1.651 m)   Wt 110.5 kg (243 lb 9.7 oz)   SpO2 99%   BMI 40.54 kg/m²                        ____________________________________________  PAST MEDICAL HISTORY  Past Medical History:   Diagnosis Date    Arthritis     At risk for sleep apnea 2022    SHAHID 6 w/ reported witnessed apnea while sleeping     Breast cancer ([address]     Rt 2016    Cancer [facility] [address]     right breast    COVID-19 2022    Endometriosis     Headache     Hypertension     Paget's disease of breast ([facility] [address]     history of     Postoperative nausea and vomiting       ____________________________________________  PAST SURGICAL HISTORY  Past Surgical History:   Procedure Laterality Date    HX BREAST LUMPECTOMY Right 10/6/2016    RIGHT BREAST CENTRAL LUMPECTOMY, RIGHT SENTINEL NODE BIOPSY, BILATERAL BREAST REDUCTION performed by Faiza Kinney MD at [facility] MA[facility]    HX BREAST RECONSTRUCTION Right 11/18/2016    BREAST RECONSTRUCTION performed by Collin Bah MD at [facility]    HX BREAST RECONSTRUCTION Right 5/3/2017    EXCHANGE RIGHT BREAST TISSUE EXPANDER FOR BREAST IMPLANT  performed by Collin Bah MD at [facility]    HX BREAST REDUCTION Bilateral 10/6/2016    BREAST REDUCTION performed by Collin Bah MD at [facility]    HX G[facility]      [facility] endometriosis   [facility].  HX MASTECTOMY Right 11/18/2016    RIGHT SKIN SPARING MASTECTOMY,RIGHT BREAST RECONSTRUCTION WITH TISSUE EXPANDER ALLODERM performed by Faiza Kinney MD at [facility]    HX PARTIAL HYSTERECTOMY      HX TUBAL LIGATION        ____________________________________________  HOME MEDICATIONS    Current Outpatient Medications   Medication Sig    losartan-hydroCHLOROthiazide (HYZAAR) 50-12.5 mg per tablet Take 1 Tablet by mouth daily.  acetaminophen (Tylenol Extra Strength) 500 mg tablet Take 1,000 mg by mouth every six (6) hours as needed for Pain.  loperamide (IMMODIUM) 2 mg tablet Take 2 mg by mouth four (4) times daily as needed for Diarrhea. No current facility-administered medications for this encounter.      ____________________________________________  ALLERGIES  Allergies   Allergen Reactions    Adhesive Tape-Silicones Rash    Other Plant, Animal, Environmental Rash and Itching     Patient reports rash and itching on hands from powder in gloves      ____________________________________________  SOCIAL HISTORY  Social History     Tobacco Use    Smoking status: Never Smoker    Smokeless tobacco: Never Used   Substance Use Topics    Alcohol use:  Yes     Alcohol/week: 0.0 standard drinks     Comment: rarely      ____________________________________________  COVID VACCINATION STATUS:      Internal Administration   First Dose COVID-19, Juancho Downing Purple top, DILUTE for use, 12+ yrs, 30mcg/0.3mL dose  04/20/2021   Second Dose COVID-19, Pfizer Purple top, DILUTE for use, 12+ yrs, 30mcg/0.3mL dose  05/16/2021      Last COVID Lab No results found for: Eric [place], RCV2CT, CVD2M, COV2, XPLCVT, QOUXYKJ0MGY, VRFVQXD1ALOX, [address], Chris Hansen, COVO4M, COVPCR, [address] Outpatient Visit on 04/25/2022   Component Date Value Ref Range Status    WBC 04/25/2022 4.6  3.6 - 11.0 K/uL Final    RBC 04/25/2022 4.37  3.80 - 5.20 M/uL Final    HGB 04/25/2022 13.6  11.5 - 16.0 g/dL Final    HCT 04/25/2022 41.3  35.0 - 47.0 % Final    MCV 04/25/2022 94.5  80.0 - 99.0 FL Final    MCH 04/25/2022 31.1  26.0 - 34.0 PG Final    MCHC 04/25/2022 32.9  30.0 - 36.5 g/dL Final    RDW 04/25/2022 13.6  11.5 - 14.5 % Final    PLATELET 40/12/8524 354  150 - 400 K/uL Final    MPV 04/25/2022 8.9  8.9 - 12.9 FL Final    NRBC 04/25/2022 0.0  0  WBC Final    ABSOLUTE NRBC 04/25/2022 0.00  0.00 - 0.01 K/uL Final    Special Requests: 04/25/2022 NO SPECIAL REQUESTS    Final    Culture result: 04/25/2022 MRSA NOT PRESENT    Final    Culture result: 04/25/2022 Screening of patient nares for MRSA is for surveillance purposes and, if positive, to facilitate isolation considerations in high risk settings. It is not intended for automatic decolonization interventions per se as regimens are not sufficiently effective to warrant routine use.     Final    Hemoglobin A1c 04/25/2022 6.1* 4.0 - 5.6 % Final    Comment: NEW METHOD  PLEASE NOTE NEW REFERENCE RANGE  (NOTE)  HbA1C Interpretive Ranges  <5.7              Normal  5.7 - 6.4         Consider Prediabetes  >6.5              Consider Diabetes      Est. average glucose 04/25/2022 128  mg/dL Final    INR 04/25/2022 1.0  0.9 - 1.1   Final    A single therapeutic range for Vit K antagonists may not be optimal for all indications - see June, 2008 issue of Chest, [employer] Evidence-Based Clinical Practice Guidelines, 8th Edition.  Prothrombin time 04/25/2022 10.0  9.0 - 11.1 sec Final    Color 04/25/2022 YELLOW/STRAW    Final    Color Reference Range: Straw, Yellow or Dark Yellow    Appearance 04/25/2022 CLEAR  CLEAR   Final    Specific gravity 04/25/2022 1.006  1.003 - 1.030   Final    pH (UA) 04/25/2022 6.5  5.0 - 8.0   Final    Protein 04/25/2022 Negative  NEG mg/dL Final    Glucose 04/25/2022 Negative  NEG mg/dL Final    Ketone 04/25/2022 Negative  NEG mg/dL Final    Bilirubin 04/25/2022 Negative  NEG   Final    Blood 04/25/2022 TRACE* NEG   Final    Urobilinogen 04/25/2022 0.2  0.2 - 1.0 EU/dL Final    Nitrites 04/25/2022 Negative  NEG   Final    Leukocyte Esterase 04/25/2022 Negative  NEG   Final    WBC 04/25/2022 0-4  0 - 4 /hpf Final    RBC 04/25/2022 0-5  0 - 5 /hpf Final    Epithelial cells 04/25/2022 FEW  FEW /lpf Final    Epithelial cell category consists of squamous cells and /or transitional urothelial cells. Renal tubular cells, if present, are separately identified as such.     Bacteria 04/25/2022 Negative  NEG /hpf Final    UA:UC IF INDICATED 04/25/2022 CULTURE NOT INDICATED BY UA RESULT  CNI   Final    Sodium 04/25/2022 140  136 - 145 mmol/L Final    Potassium 04/25/2022 3.7  3.5 - 5.1 mmol/L Final    Chloride 04/25/2022 107  97 - 108 mmol/L Final    CO2 04/25/2022 28  21 - 32 mmol/L Final    Anion gap 04/25/2022 5  5 - 15 mmol/L Final    Glucose 04/25/2022 103* 65 - 100 mg/dL Final    BUN 04/25/2022 11  6 - 20 MG/DL Final    Creatinine 04/25/2022 0.71  0.55 - 1.02 MG/DL Final    BUN/Creatinine ratio 04/25/2022 15  12 - 20   Final    GFR est AA 04/25/2022 >60  >60 ml/min/1.73m2 Final    GFR est non-AA 04/25/2022 >60  >60 ml/min/1.73m2 Final    Estimated GFR is calculated using the IDMS-traceable Modification of Diet in Renal Disease (MDRD) Study equation, reported for both  Americans (GFRAA) and non- Americans (GFRNA), and normalized to 1.73m2 body surface area. The physician must decide which value applies to the patient.  Calcium 04/25/2022 9.2  8.5 - 10.1 MG/DL Final    Bilirubin, total 04/25/2022 0.4  0.2 - 1.0 MG/DL Final    ALT (SGPT) 04/25/2022 28  12 - 78 U/L Final    AST (SGOT) 04/25/2022 24  15 - 37 U/L Final    Alk. phosphatase 04/25/2022 58  45 - 117 U/L Final    Protein, total 04/25/2022 7.7  6.4 - 8.2 g/dL Final    Albumin 04/25/2022 3.7  3.5 - 5.0 g/dL Final    Globulin 04/25/2022 4.0  2.0 - 4.0 g/dL Final    A-G Ratio 04/25/2022 0.9* 1.1 - 2.2   Final    Crossmatch Expiration 04/25/2022 05/05/2022,2359   Final    ABO/Rh(D) 04/25/2022 O POSITIVE   Final    Antibody screen 04/25/2022 NEG   Final        XR Results (most recent):    No results found for this or any previous visit. Skin:   Denies open wounds, cuts, sores, rashes or other areas of concern in PAT assessment. Darleen Davidson NP     4/26/22 BMI 40.54 in PAT (39.54 in [address] [place] [address] office) w/ height discrepancy noted. Surgeon advised. OK to proceed w/ surgery as planned per surgeon.

## 2022-05-19 ENCOUNTER — HOSPITAL ENCOUNTER (OUTPATIENT)
Dept: PREADMISSION TESTING | Age: 67
Discharge: HOME | End: 2022-05-19
Payer: COMMERCIAL

## 2022-05-19 VITALS
HEART RATE: 87 BPM | RESPIRATION RATE: 87 BRPM | HEIGHT: 66 IN | SYSTOLIC BLOOD PRESSURE: 101 MMHG | DIASTOLIC BLOOD PRESSURE: 59 MMHG | OXYGEN SATURATION: 96 % | TEMPERATURE: 98.2 F | WEIGHT: 243.61 LBS | BODY MASS INDEX: 39.15 KG/M2

## 2022-05-19 LAB
ALBUMIN SERPL-MCNC: 3.8 G/DL (ref 3.5–5)
ALBUMIN/GLOB SERPL: 0.9 {RATIO} (ref 1.1–2.2)
ALP SERPL-CCNC: 56 U/L (ref 45–117)
ALT SERPL-CCNC: 29 U/L (ref 12–78)
ANION GAP SERPL CALC-SCNC: 4 MMOL/L (ref 5–15)
APPEARANCE UR: CLEAR
AST SERPL-CCNC: 31 U/L (ref 15–37)
BACTERIA URNS QL MICRO: NEGATIVE /HPF
BILIRUB SERPL-MCNC: 0.5 MG/DL (ref 0.2–1)
BILIRUB UR QL: NEGATIVE
BUN SERPL-MCNC: 12 MG/DL (ref 6–20)
BUN/CREAT SERPL: 16 (ref 12–20)
CALCIUM SERPL-MCNC: 9.3 MG/DL (ref 8.5–10.1)
CHLORIDE SERPL-SCNC: 105 MMOL/L (ref 97–108)
CO2 SERPL-SCNC: 31 MMOL/L (ref 21–32)
COLOR UR: ABNORMAL
CREAT SERPL-MCNC: 0.73 MG/DL (ref 0.55–1.02)
EPITH CASTS URNS QL MICRO: ABNORMAL /LPF
ERYTHROCYTE [DISTWIDTH] IN BLOOD BY AUTOMATED COUNT: 13.7 % (ref 11.5–14.5)
GLOBULIN SER CALC-MCNC: 4.3 G/DL (ref 2–4)
GLUCOSE SERPL-MCNC: 113 MG/DL (ref 65–100)
GLUCOSE UR STRIP.AUTO-MCNC: NEGATIVE MG/DL
HCT VFR BLD AUTO: 40.6 % (ref 35–47)
HGB BLD-MCNC: 13.2 G/DL (ref 11.5–16)
HGB UR QL STRIP: ABNORMAL
INR PPP: 1 (ref 0.9–1.1)
KETONES UR QL STRIP.AUTO: NEGATIVE MG/DL
LEUKOCYTE ESTERASE UR QL STRIP.AUTO: NEGATIVE
MCH RBC QN AUTO: 30.4 PG (ref 26–34)
MCHC RBC AUTO-ENTMCNC: 32.5 G/DL (ref 30–36.5)
MCV RBC AUTO: 93.5 FL (ref 80–99)
MUCOUS THREADS URNS QL MICRO: ABNORMAL /LPF
NITRITE UR QL STRIP.AUTO: NEGATIVE
NRBC # BLD: 0 K/UL (ref 0–0.01)
NRBC BLD-RTO: 0 PER 100 WBC
PH UR STRIP: 6 [PH] (ref 5–8)
PLATELET # BLD AUTO: 264 K/UL (ref 150–400)
PMV BLD AUTO: 8.6 FL (ref 8.9–12.9)
POTASSIUM SERPL-SCNC: 3.2 MMOL/L (ref 3.5–5.1)
PROT SERPL-MCNC: 8.1 G/DL (ref 6.4–8.2)
PROT UR STRIP-MCNC: NEGATIVE MG/DL
PROTHROMBIN TIME: 10.4 SEC (ref 9–11.1)
RBC # BLD AUTO: 4.34 M/UL (ref 3.8–5.2)
RBC #/AREA URNS HPF: ABNORMAL /HPF (ref 0–5)
SODIUM SERPL-SCNC: 140 MMOL/L (ref 136–145)
SP GR UR REFRACTOMETRY: 1.02 (ref 1–1.03)
UA: UC IF INDICATED,UAUC: ABNORMAL
UROBILINOGEN UR QL STRIP.AUTO: 0.2 EU/DL (ref 0.2–1)
WBC # BLD AUTO: 6.1 K/UL (ref 3.6–11)
WBC URNS QL MICRO: ABNORMAL /HPF (ref 0–4)

## 2022-05-19 PROCEDURE — 86900 BLOOD TYPING SEROLOGIC ABO: CPT

## 2022-05-19 PROCEDURE — 80053 COMPREHEN METABOLIC PANEL: CPT

## 2022-05-19 PROCEDURE — 36415 COLL VENOUS BLD VENIPUNCTURE: CPT

## 2022-05-19 PROCEDURE — 85610 PROTHROMBIN TIME: CPT

## 2022-05-19 PROCEDURE — 85027 COMPLETE CBC AUTOMATED: CPT

## 2022-05-19 PROCEDURE — 81001 URINALYSIS AUTO W/SCOPE: CPT

## 2022-05-19 RX ORDER — PREGABALIN 150 MG/1
150 CAPSULE ORAL ONCE
Status: CANCELLED | OUTPATIENT
Start: 2022-05-26 | End: 2022-05-26

## 2022-05-19 RX ORDER — ACETAMINOPHEN 500 MG
1000 TABLET ORAL ONCE
Status: CANCELLED | OUTPATIENT
Start: 2022-05-26 | End: 2022-05-26

## 2022-05-19 RX ORDER — CEFAZOLIN SODIUM/WATER 2 G/20 ML
2 SYRINGE (ML) INTRAVENOUS ONCE
Status: CANCELLED | OUTPATIENT
Start: 2022-05-26 | End: 2022-05-26

## 2022-05-19 RX ORDER — SODIUM CHLORIDE, SODIUM LACTATE, POTASSIUM CHLORIDE, CALCIUM CHLORIDE 600; 310; 30; 20 MG/100ML; MG/100ML; MG/100ML; MG/100ML
25 INJECTION, SOLUTION INTRAVENOUS CONTINUOUS
Status: CANCELLED | OUTPATIENT
Start: 2022-05-26

## 2022-05-19 RX ORDER — CELECOXIB 200 MG/1
400 CAPSULE ORAL ONCE
Status: CANCELLED | OUTPATIENT
Start: 2022-05-26 | End: 2022-05-26

## 2022-05-19 NOTE — PERIOP NOTES
Incentive Spirometer        Using the incentive spirometer helps expand the small air sacs of your lungs, helps you breathe deeply, and helps improve your lung function. Use your incentive spirometer twice a day (10 breaths each time) prior to surgery. How to Use Your Incentive Spirometer:  1. Hold the incentive spirometer in an upright position. 2. Breathe out as usual.   3. Place the mouthpiece in your mouth and seal your lips tightly around it. 4. Take a deep breath. Breathe in slowly and as deeply as possible. Keep the blue flow rate guide between the arrows. 5. Hold your breath as long as possible. Then exhale slowly and allow the piston to fall to the bottom of the column. 6. Rest for a few seconds and repeat steps one through five at least 10 times. PAT Tidal Volume_______1850 x 2____  Date___5/19/22____________________    Evie Samsoni THE INCENTIVE SPIROMETER WITH YOU TO THE HOSPITAL ON THE DAY OF YOUR SURGERY. Opportunity given to ask and answer questions as well as to observe return demonstration.     Patient signature_____________________________    Witness____________________________

## 2022-05-19 NOTE — PERIOP NOTES
[facility]  Joint/Spine Preoperative Instructions        Surgery Date 5/26/22         Time of Arrival to be called @ [phone]     1. On the day of your surgery, please report to the Surgical Services Registration Desk and sign in at your designated time. The Surgery Center is located to the right of the Emergency Room. 2. You must have someone with you to drive you home. You should not drive a car for 24 hours following surgery. Please make arrangements for a friend or family member to stay with you for the first 24 hours after your surgery. 3. No food after midnight. Medications morning of surgery should be taken with a sip of water. Please follow pre-surgery drink instructions that were given at your Pre Admission Testing appointment. 4. We recommend you do not drink any alcoholic beverages for 24 hours before and after your surgery. 5. Contact your surgeons office for instructions on the following medications: non-steroidal anti-inflammatory drugs (i.e. Advil, Aleve), vitamins, and supplements. (Some surgeons will want you to stop these medications prior to surgery and others may allow you to take them)  **If you are currently taking Plavix, Coumadin, Aspirin and/or other blood-thinning agents, contact your surgeon for instructions. ** Your surgeon will partner with the physician prescribing these medications to determine if it is safe to stop or if you need to continue taking. Please do not stop taking these medications without instructions from your surgeon    6. Wear comfortable clothes. Wear glasses instead of contacts. Do not bring any money or jewelry. Please bring picture ID, insurance card, and any prearranged co-payment or hospital payment. Do not wear make-up, particularly mascara the morning of your surgery. Do not wear nail polish, particularly if you are having foot /hand surgery.   Wear your hair loose or down, no ponytails, buns, alex pins or clips. All body piercings must be removed. Please shower with antibacterial soap for three consecutive days before and on the morning of surgery, but do not apply any lotions, powders or deodorants after the shower on the day of surgery. Please use a fresh towels after each shower. Please sleep in clean clothes and change bed linens the night before surgery. Please do not shave for 48 hours prior to surgery. Shaving of the face is acceptable. 7. You should understand that if you do not follow these instructions your surgery may be cancelled. If your physical condition changes (I.e. fever, cold or flu) please contact your surgeon as soon as possible. 8. It is important that you be on time. If a situation occurs where you may be late, please call [phone] (OR Holding Area). 9. If you have any questions and or problems, please call [phone] (Pre-admission Testing). 10. Your surgery time may be subject to change. You will receive a phone call the evening prior if your time changes. 11.  If having outpatient surgery, you must have someone to drive you here, stay with you during the duration of your stay, and to drive you home at time of discharge. 12. The following link is for the educational video for patients and/or families. http://[email or web address]-martin.[email or web address]-vzqcssd-sdxqfsd/[email or web address]/educational-materials        Special Instructions: per DR Amilcar Singh instructions no over the counter vitamins, supplements or NSAIDS 5 days prior to surgery       TAKE ALL MEDICATIONS THE DAY OF SURGERY       I understand a pre-operative phone call will be made to verify my surgery time. In the event that I am not available, I give permission for a message to be left on my answering service and/or with another person?   yes         ___________________      __________   _________    (Signature of Patient)             (Witness)                (Date and Time)

## 2022-05-19 NOTE — PERIOP NOTES
Hibiclens/Chlorhexidine    Preventing Infections Before and After - Your Surgery    IMPORTANT INSTRUCTIONS    Please read and follow these instructions carefully. If you are unable to comply with the below instructions your procedure will be cancelled. Every Night for Three (3) nights before your surgery:  1. Shower with an antibacterial soap, such as Dial, or the soap provided at your preassessment appointment. A shower is better than a bath for cleaning your skin. 2. If needed, ask someone to help you reach all areas of your body. Dont forget to clean your belly button with every shower. The night before your surgery: If you lose your Hibiclens/chlorhexidine please contact surgery center or you can purchase it at a local pharmacy  1. On the night before your surgery, shower with an antibacterial soap, such as Dial, or the soap provided at your preassessment appointment. 2. With one packet of Hibiclens/Chlorhexidine in hand, turn water off.  3. Apply Hibiclens antiseptic skin cleanser with a clean, freshly washed washcloth. ? Gently apply to your body from chin to toes (except the genital area) and especially the area(s) where your incision(s) will be. ? Leave Hibiclens/Chlorhexidine on your skin for at least 20 seconds. CAUTION: If needed, Hibiclens/chlorhexidine may be used to clean the folds of skin of the legs (such as in the area of the groin) and on your buttocks and hips. However, do not use Hibiclens/Chlorhexidine above the neck or in the genital area (your bottom) or put inside any area of your body. 4. Turn the water back on and rinse. 5. Dry gently with a clean, freshly washed towel. 6. After your shower, do not use any powder, deodorant, perfumes or lotion. 7. Use clean, freshly washed towels and washcloths every time you shower. 8. Wear clean, freshly washed pajamas to bed the night before surgery. 9. Sleep on clean, freshly washed sheets.   10. Do not allow pets to sleep in your bed with you. The Morning of your surgery:  1. Shower again thoroughly with an antibacterial soap, such as Dial or the soap provided at your preassessment appointment. If needed, ask someone for help to reach all areas of your body. Dont forget to clean your belly button! Rinse. 2. Dry gently with a clean, freshly washed towel. 3. After your shower, do not use any powder, deodorant, perfumes or lotion prior to surgery. 4. Put on clean, freshly washed clothing. Tips to help prevent infections after your surgery:  1. Protect your surgical wound from germs:  ? Hand washing is the most important thing you and your caregivers can do to prevent infections. ? Keep your bandage clean and dry! ? Do not touch your surgical wound. 2. Use clean, freshly washed towels and washcloths every time you shower; do not share bath linens with others. 3. Until your surgical wound is healed, wear clothing and sleep on bed linens each day that are clean and freshly washed. 4. Do not allow pets to sleep in your bed with you or touch your surgical wound. 5. Do not smoke - smoking delays wound healing. This may be a good time to stop smoking. 6. If you have diabetes, it is important for you to manage your blood sugar levels properly before your surgery as well as after your surgery. Poorly managed blood sugar levels slow down wound healing and prevent you from healing completely. If you lose your Hibiclens/chlorhexidine, please call the [facility], or it is available for purchase at your pharmacy.                ___________________      ___________________      ________________  (Signature of Patient)          (Witness)                   (Date and Time)

## 2022-05-19 NOTE — PERIOP NOTES
The [address] \"Your Path to a More Active Life\" orthopedic total knee or total hip educational video and the [facility] patient handbook provided & reviewed during the patients pre-admission testing (PAT) appointment. An opportunity for questions was provided, patient verbalized understanding.

## 2022-05-19 NOTE — PERIOP NOTES
Orthopedic and Spine Patients: Instructions on When You Can   Eat or Drink Before Surgery      You have been provided 2 pre-surgery drinks received at your pre-admission testing appointment.  Night before surgery:  o You should drink one bottle of the  pre-surgery drink at bedtime. No food after midnight!  Day of Surgery:  o Complete 2nd bottle of the pre-surgery drink 1 hour prior to arrival at hospital.  For questions call Pre-Admission Testing at [phone]. They are available from 8:00am-5:00pm, Monday through Friday.

## 2022-05-20 LAB
ABO + RH BLD: NORMAL
BACTERIA SPEC CULT: NORMAL
BACTERIA SPEC CULT: NORMAL
BLOOD GROUP ANTIBODIES SERPL: NORMAL
SERVICE CMNT-IMP: NORMAL
SPECIMEN EXP DATE BLD: NORMAL

## 2022-05-20 NOTE — PERIOP NOTES
Phone call to Dr Ruma Odom office LM with [facility] regarding K level from yesterday- requesting plan.  Fax sent yesterday by Sam Radford RN

## 2022-05-25 ENCOUNTER — ANESTHESIA EVENT (OUTPATIENT)
Dept: SURGERY | Age: 67
End: 2022-05-25
Payer: COMMERCIAL

## 2022-05-25 NOTE — H&P
Breanna Sebastian MD - Adult Reconstruction and Total Joint Replacement     Orthopaedic History and Physical        NAME: Dannie Riddle       :  1955       MRN:  [id number]        HISTORY OF PRESENT ILLNESS:  Chief Complaint: Pain of the Left Knee    Age: 61 y.o. Sex: female   Occupation: Bakery  Hand-dominance: Hand-dominance: Right    History of present illness: Ms. Nate Mueller who is new to me presents today for complaint of nearly 3 years left knee pain after a fall in a hole at work in 2019 and subsequently developing chronic left knee pain with underlying osteoarthritis. She has had a lengthy period of conservative treatment over that time frame and seems to be failing that. She has had multiple steroid injections by my former partner. She has also been treated with NSAIDs Tylenol rest physical therapy. 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 She has difficulty rising from a chair after sitting for while. Sometimes she has night pain. She has difficulty going up and down steps with this left knee. X-rays have revealed bone-on-bone arthritis of the left knee.       Patient Active Problem List    Diagnosis Date Noted    Non compliance with medical treatment 2017    Elevated hemoglobin A1c 2017    Morbid obesity ([address] 2017    Essential hypertension 2017    Snores 2017    Paget's disease of left breast ([facility] 2016     Past Medical History:   Diagnosis Date    Adverse effect of anesthesia     slow to wake after anesthesia     Arthritis     At risk for sleep apnea 2022    SHAHID 6 w/ reported witnessed apnea while sleeping     Breast cancer ([address]     Rt 2016    Cancer ([address]     right breast    COVID-19 2022    Elevated hemoglobin A1c 2022    A1C-6.1    Endometriosis     Headache     Hypertension     Paget's disease of breast ([address]     history of     Postoperative nausea and vomiting       Past Surgical History:   Procedure Laterality Date    HX BREAST LUMPECTOMY Right 10/6/2016    RIGHT BREAST CENTRAL LUMPECTOMY, RIGHT SENTINEL NODE BIOPSY, BILATERAL BREAST REDUCTION performed by Aquiles Sanchez MD at [facility]    HX BREAST RECONSTRUCTION Right 11/18/2016    BREAST RECONSTRUCTION performed by Ronnie Spain MD at [facility]    HX BREAST RECONSTRUCTION Right 5/3/2017    EXCHANGE RIGHT BREAST TISSUE EXPANDER FOR BREAST IMPLANT  performed by [facility] Spain MD at [facility]    HX BREAST REDUCTION Bilateral 10/6/2016    BREAST REDUCTION performed by [facility] Spain MD at [facility]    [facility]X [facility]      [facility]  HX MASTECTOMY Right 11/18/2016    RIGHT SKIN SPARING MASTECTOMY,RIGHT BREAST RECONSTRUCTION WITH TISSUE EXPANDER ALLODERM performed by Aquiles Sanchez MD at [facility]    HX PARTIAL HYSTERECTOMY      HX TUBAL LIGATION        Prior to Admission medications    Medication Sig Start Date End Date Taking? Authorizing Provider   losartan-hydroCHLOROthiazide (HYZAAR) 50-12.5 mg per tablet Take 1 Tablet by mouth daily. Provider, Historical   acetaminophen (Tylenol Extra Strength) 500 mg tablet Take 1,000 mg by mouth every six (6) hours as needed for Pain. Provider, Historical   loperamide (IMMODIUM) 2 mg tablet Take 2 mg by mouth four (4) times daily as needed for Diarrhea.     Provider, Historical     Allergies   Allergen Reactions    Adhesive Tape-Silicones Rash    Other Plant, Animal, Environmental Rash and Itching     Patient reports rash and itching on hands from powder in gloves      Social History     Tobacco Use    Smoking status: Never Smoker    Smokeless tobacco: Never Used   Substance Use Topics    Alcohol use: Not Currently     Alcohol/week: 0.0 standard drinks     Comment: rarely      Family History   Problem Relation Age of Onset    Heart Disease Mother     Hypertension Mother     Diabetes Mother     Cancer Mother         unknown    Breast Cancer Mother         42's    Heart Disease Father     Cancer Father         unknown    Hypertension Sister     Hypertension Brother     Diabetes Brother     Hypertension Sister     Diabetes Sister         REVIEW OF SYSTEMS: A comprehensive review of systems was negative except for that written in the HPI. CARE TEAM:  Patient Care Team:  No Pcp as PCP - General (General Practice)    ASSESSMENT:  ICD-10-CM   1. Arthritis of knee M17.10     PLAN:  Treatment Plan: I explained the patient that there is not any other alternative other than a knee replacement to definitively resolve her symptoms. She has already had a lengthy period of conservative treatment for nearly 3 years which is involved steroid injections physical therapy NSAIDs Tylenol other pain medications rest time and unfortunately is continued to have difficulty walking going up and down steps and doing her job. Patient is already scheduled for surgery recommend she proceed to surgery. I evaluated and counseled this patient for over 25 minutes concerning her diagnosis and possible treatments. Orders Placed This Encounter    BMI >=25 PATIENT INSTRUCTIONS & EDUCATION     Follow-up: No follow-ups on file. Past Medical History:   Diagnosis Date    Cancer    Hypertension     OBJECTIVE:  Constitutional: No acute distress. Well nourished. Well developed. Her body mass index is 39.54 kg/m². HEENT: Atraumatic normocephalic  Resp: No labored breathing  Cardiovascular: No marked edema. Psychiatric: Alert and oriented x3. Musculoskeletal     Left knee exam: Exquisitely tender in the medial joint line. Range of motion 0-115 degrees with pain on extreme flexion felt anterior and medial knee. 1+ valgus laxity no varus laxity. Positive crepitus. Normal patellar tracking. Positive varus deformity.  No pain in the hip with range of motion. Calf soft nontender. Neurovascular intact distally. Positive antalgic gait on the left. IMAGING / STUDIES:          No imaging obtained   Personally evaluated x-rays of the left knee three views dated 02/28/2022 the reveal bone-on-bone appearance of the medial compartment with periarticular osteophytes. Positive mild varus alignment. Positive sclerosis of the articular surface in the medial compartment. Severe narrowing of the patellofemoral joint. Bones otherwise normal.  PROCEDURES:  Procedures     I, Brooklynn Hawthorne MD, personally, performed the services described in this documentation, as scribed in my presence, and it is both accurate and complete. Scribed by: Brooklynn Hawthorne     Addendum: this patient also met with Dr Rose Mary Phelps in the office when she was in for a visit with Dr Roxann Howell. The r/b/a to the procedure were discussed and she has maintained a BMI below 40.     MILY Liriano

## 2022-05-26 ENCOUNTER — ANESTHESIA (OUTPATIENT)
Dept: SURGERY | Age: 67
End: 2022-05-26
Payer: COMMERCIAL

## 2022-05-26 ENCOUNTER — HOSPITAL ENCOUNTER (OUTPATIENT)
Age: 67
Discharge: HOME HEALTH | End: 2022-05-27
Attending: ORTHOPAEDIC SURGERY | Admitting: ORTHOPAEDIC SURGERY
Payer: COMMERCIAL

## 2022-05-26 DIAGNOSIS — Z96.652 STATUS POST TOTAL LEFT KNEE REPLACEMENT: Primary | ICD-10-CM

## 2022-05-26 PROBLEM — M17.12 OSTEOARTHRITIS OF LEFT KNEE: Status: ACTIVE | Noted: 2022-05-26

## 2022-05-26 PROCEDURE — C1776 JOINT DEVICE (IMPLANTABLE): HCPCS | Performed by: ORTHOPAEDIC SURGERY

## 2022-05-26 PROCEDURE — 77030008684 HC TU ET CUF COVD -B: Performed by: NURSE ANESTHETIST, CERTIFIED REGISTERED

## 2022-05-26 PROCEDURE — 76060000033 HC ANESTHESIA 1 TO 1.5 HR: Performed by: ORTHOPAEDIC SURGERY

## 2022-05-26 PROCEDURE — 77030036638 HC ACC KT GPS KNE V2 EXAC -D: Performed by: ORTHOPAEDIC SURGERY

## 2022-05-26 PROCEDURE — 74011250636 HC RX REV CODE- 250/636: Performed by: ORTHOPAEDIC SURGERY

## 2022-05-26 PROCEDURE — 74011250636 HC RX REV CODE- 250/636: Performed by: STUDENT IN AN ORGANIZED HEALTH CARE EDUCATION/TRAINING PROGRAM

## 2022-05-26 PROCEDURE — 2709999900 HC NON-CHARGEABLE SUPPLY: Performed by: ORTHOPAEDIC SURGERY

## 2022-05-26 PROCEDURE — 97161 PT EVAL LOW COMPLEX 20 MIN: CPT

## 2022-05-26 PROCEDURE — 76210000016 HC OR PH I REC 1 TO 1.5 HR: Performed by: ORTHOPAEDIC SURGERY

## 2022-05-26 PROCEDURE — 2709999900 HC NON-CHARGEABLE SUPPLY

## 2022-05-26 PROCEDURE — 77030000032 HC CUF TRNQT ZIMM -B: Performed by: ORTHOPAEDIC SURGERY

## 2022-05-26 PROCEDURE — 77030036722: Performed by: ORTHOPAEDIC SURGERY

## 2022-05-26 PROCEDURE — 74011250637 HC RX REV CODE- 250/637: Performed by: STUDENT IN AN ORGANIZED HEALTH CARE EDUCATION/TRAINING PROGRAM

## 2022-05-26 PROCEDURE — 77030018673: Performed by: ORTHOPAEDIC SURGERY

## 2022-05-26 PROCEDURE — 97530 THERAPEUTIC ACTIVITIES: CPT

## 2022-05-26 PROCEDURE — 77030006835 HC BLD SAW SAG STRY -B: Performed by: ORTHOPAEDIC SURGERY

## 2022-05-26 PROCEDURE — 74011000250 HC RX REV CODE- 250: Performed by: NURSE ANESTHETIST, CERTIFIED REGISTERED

## 2022-05-26 PROCEDURE — 77030035236 HC SUT PDS STRATFX BARB J&J -B: Performed by: ORTHOPAEDIC SURGERY

## 2022-05-26 PROCEDURE — 77030031139 HC SUT VCRL2 J&J -A: Performed by: ORTHOPAEDIC SURGERY

## 2022-05-26 PROCEDURE — 74011250636 HC RX REV CODE- 250/636: Performed by: NURSE ANESTHETIST, CERTIFIED REGISTERED

## 2022-05-26 PROCEDURE — 76010000161 HC OR TIME 1 TO 1.5 HR INTENSV-TIER 1: Performed by: ORTHOPAEDIC SURGERY

## 2022-05-26 PROCEDURE — 74011000250 HC RX REV CODE- 250: Performed by: PHYSICIAN ASSISTANT

## 2022-05-26 PROCEDURE — 74011250637 HC RX REV CODE- 250/637: Performed by: ORTHOPAEDIC SURGERY

## 2022-05-26 PROCEDURE — 74011250637 HC RX REV CODE- 250/637: Performed by: PHYSICIAN ASSISTANT

## 2022-05-26 PROCEDURE — 74011250636 HC RX REV CODE- 250/636: Performed by: PHYSICIAN ASSISTANT

## 2022-05-26 PROCEDURE — 74011000250 HC RX REV CODE- 250: Performed by: ORTHOPAEDIC SURGERY

## 2022-05-26 PROCEDURE — 77030034479 HC ADH SKN CLSR PRINEO J&J -B: Performed by: ORTHOPAEDIC SURGERY

## 2022-05-26 PROCEDURE — 77030033067 HC SUT PDO STRATFX SPIR J&J -B: Performed by: ORTHOPAEDIC SURGERY

## 2022-05-26 PROCEDURE — 65270000029 HC RM PRIVATE

## 2022-05-26 PROCEDURE — 77030026438 HC STYL ET INTUB CARD -A: Performed by: NURSE ANESTHETIST, CERTIFIED REGISTERED

## 2022-05-26 DEVICE — IMPLANTABLE DEVICE
Type: IMPLANTABLE DEVICE | Site: KNEE | Status: FUNCTIONAL
Brand: TRULIANT

## 2022-05-26 DEVICE — TRULIANT POROUS TIBIAL TRAY
Type: IMPLANTABLE DEVICE | Site: KNEE | Status: FUNCTIONAL
Brand: TRULIANT

## 2022-05-26 DEVICE — TRULIANT CR POROUS FEMORAL
Type: IMPLANTABLE DEVICE | Site: KNEE | Status: FUNCTIONAL
Brand: TRULIANT

## 2022-05-26 DEVICE — COMPONENT TOT KNEE CAPPED K2 HEMI ADV CMNTLS K2EXACTECH: Type: IMPLANTABLE DEVICE | Status: FUNCTIONAL

## 2022-05-26 DEVICE — IMPLANTABLE DEVICE
Type: IMPLANTABLE DEVICE | Site: KNEE | Status: FUNCTIONAL
Brand: ALTEON

## 2022-05-26 RX ORDER — SODIUM CHLORIDE, SODIUM LACTATE, POTASSIUM CHLORIDE, CALCIUM CHLORIDE 600; 310; 30; 20 MG/100ML; MG/100ML; MG/100ML; MG/100ML
25 INJECTION, SOLUTION INTRAVENOUS CONTINUOUS
Status: DISCONTINUED | OUTPATIENT
Start: 2022-05-26 | End: 2022-05-26 | Stop reason: HOSPADM

## 2022-05-26 RX ORDER — OXYCODONE HYDROCHLORIDE 5 MG/1
5 TABLET ORAL AS NEEDED
Status: DISCONTINUED | OUTPATIENT
Start: 2022-05-26 | End: 2022-05-26 | Stop reason: HOSPADM

## 2022-05-26 RX ORDER — SODIUM CHLORIDE 9 MG/ML
125 INJECTION, SOLUTION INTRAVENOUS CONTINUOUS
Status: DISPENSED | OUTPATIENT
Start: 2022-05-26 | End: 2022-05-27

## 2022-05-26 RX ORDER — OXYCODONE HYDROCHLORIDE 5 MG/1
5 TABLET ORAL
Status: DISCONTINUED | OUTPATIENT
Start: 2022-05-26 | End: 2022-05-27 | Stop reason: HOSPADM

## 2022-05-26 RX ORDER — NEOSTIGMINE METHYLSULFATE 1 MG/ML
INJECTION, SOLUTION INTRAVENOUS AS NEEDED
Status: DISCONTINUED | OUTPATIENT
Start: 2022-05-26 | End: 2022-05-26 | Stop reason: HOSPADM

## 2022-05-26 RX ORDER — MORPHINE SULFATE 2 MG/ML
2 INJECTION, SOLUTION INTRAMUSCULAR; INTRAVENOUS
Status: ACTIVE | OUTPATIENT
Start: 2022-05-26 | End: 2022-05-27

## 2022-05-26 RX ORDER — ROPIVACAINE HYDROCHLORIDE 5 MG/ML
INJECTION, SOLUTION EPIDURAL; INFILTRATION; PERINEURAL
Status: COMPLETED | OUTPATIENT
Start: 2022-05-26 | End: 2022-05-26

## 2022-05-26 RX ORDER — SUCCINYLCHOLINE CHLORIDE 20 MG/ML
INJECTION INTRAMUSCULAR; INTRAVENOUS AS NEEDED
Status: DISCONTINUED | OUTPATIENT
Start: 2022-05-26 | End: 2022-05-26 | Stop reason: HOSPADM

## 2022-05-26 RX ORDER — FACIAL-BODY WIPES
10 EACH TOPICAL DAILY PRN
Status: DISCONTINUED | OUTPATIENT
Start: 2022-05-28 | End: 2022-05-27 | Stop reason: HOSPADM

## 2022-05-26 RX ORDER — ACETAMINOPHEN 500 MG
1000 TABLET ORAL ONCE
Status: COMPLETED | OUTPATIENT
Start: 2022-05-26 | End: 2022-05-26

## 2022-05-26 RX ORDER — ROPIVACAINE HYDROCHLORIDE 5 MG/ML
INJECTION, SOLUTION EPIDURAL; INFILTRATION; PERINEURAL AS NEEDED
Status: DISCONTINUED | OUTPATIENT
Start: 2022-05-26 | End: 2022-05-26 | Stop reason: HOSPADM

## 2022-05-26 RX ORDER — POLYETHYLENE GLYCOL 3350 17 G/17G
17 POWDER, FOR SOLUTION ORAL DAILY
Status: DISCONTINUED | OUTPATIENT
Start: 2022-05-26 | End: 2022-05-27 | Stop reason: HOSPADM

## 2022-05-26 RX ORDER — DEXAMETHASONE SODIUM PHOSPHATE 4 MG/ML
10 INJECTION, SOLUTION INTRA-ARTICULAR; INTRALESIONAL; INTRAMUSCULAR; INTRAVENOUS; SOFT TISSUE ONCE
Status: COMPLETED | OUTPATIENT
Start: 2022-05-27 | End: 2022-05-27

## 2022-05-26 RX ORDER — AMOXICILLIN 250 MG
1 CAPSULE ORAL 2 TIMES DAILY
Status: DISCONTINUED | OUTPATIENT
Start: 2022-05-26 | End: 2022-05-27 | Stop reason: HOSPADM

## 2022-05-26 RX ORDER — LIDOCAINE HYDROCHLORIDE 20 MG/ML
INJECTION, SOLUTION EPIDURAL; INFILTRATION; INTRACAUDAL; PERINEURAL AS NEEDED
Status: DISCONTINUED | OUTPATIENT
Start: 2022-05-26 | End: 2022-05-26 | Stop reason: HOSPADM

## 2022-05-26 RX ORDER — ASPIRIN 81 MG/1
81 TABLET ORAL 2 TIMES DAILY
Status: DISCONTINUED | OUTPATIENT
Start: 2022-05-26 | End: 2022-05-27 | Stop reason: HOSPADM

## 2022-05-26 RX ORDER — FAMOTIDINE 20 MG/1
20 TABLET, FILM COATED ORAL 2 TIMES DAILY
Status: DISCONTINUED | OUTPATIENT
Start: 2022-05-26 | End: 2022-05-27 | Stop reason: HOSPADM

## 2022-05-26 RX ORDER — TRANEXAMIC ACID 100 MG/ML
INJECTION, SOLUTION INTRAVENOUS AS NEEDED
Status: DISCONTINUED | OUTPATIENT
Start: 2022-05-26 | End: 2022-05-26 | Stop reason: HOSPADM

## 2022-05-26 RX ORDER — GLYCOPYRROLATE 0.2 MG/ML
INJECTION INTRAMUSCULAR; INTRAVENOUS AS NEEDED
Status: DISCONTINUED | OUTPATIENT
Start: 2022-05-26 | End: 2022-05-26 | Stop reason: HOSPADM

## 2022-05-26 RX ORDER — CELECOXIB 200 MG/1
400 CAPSULE ORAL ONCE
Status: COMPLETED | OUTPATIENT
Start: 2022-05-26 | End: 2022-05-26

## 2022-05-26 RX ORDER — FENTANYL CITRATE 50 UG/ML
25 INJECTION, SOLUTION INTRAMUSCULAR; INTRAVENOUS
Status: DISCONTINUED | OUTPATIENT
Start: 2022-05-26 | End: 2022-05-26 | Stop reason: HOSPADM

## 2022-05-26 RX ORDER — OXYCODONE HYDROCHLORIDE 5 MG/1
10 TABLET ORAL
Status: DISCONTINUED | OUTPATIENT
Start: 2022-05-26 | End: 2022-05-27 | Stop reason: HOSPADM

## 2022-05-26 RX ORDER — NALOXONE HYDROCHLORIDE 0.4 MG/ML
0.4 INJECTION, SOLUTION INTRAMUSCULAR; INTRAVENOUS; SUBCUTANEOUS AS NEEDED
Status: DISCONTINUED | OUTPATIENT
Start: 2022-05-26 | End: 2022-05-27 | Stop reason: HOSPADM

## 2022-05-26 RX ORDER — HYDROMORPHONE HYDROCHLORIDE 1 MG/ML
0.2 INJECTION, SOLUTION INTRAMUSCULAR; INTRAVENOUS; SUBCUTANEOUS
Status: DISCONTINUED | OUTPATIENT
Start: 2022-05-26 | End: 2022-05-26 | Stop reason: HOSPADM

## 2022-05-26 RX ORDER — SODIUM CHLORIDE 0.9 % (FLUSH) 0.9 %
5-40 SYRINGE (ML) INJECTION EVERY 8 HOURS
Status: DISCONTINUED | OUTPATIENT
Start: 2022-05-26 | End: 2022-05-27 | Stop reason: HOSPADM

## 2022-05-26 RX ORDER — ONDANSETRON 4 MG/1
4 TABLET, ORALLY DISINTEGRATING ORAL
Status: DISCONTINUED | OUTPATIENT
Start: 2022-05-28 | End: 2022-05-27 | Stop reason: HOSPADM

## 2022-05-26 RX ORDER — ACETAMINOPHEN 325 MG/1
650 TABLET ORAL EVERY 6 HOURS
Status: DISCONTINUED | OUTPATIENT
Start: 2022-05-26 | End: 2022-05-27 | Stop reason: HOSPADM

## 2022-05-26 RX ORDER — PROPOFOL 10 MG/ML
INJECTION, EMULSION INTRAVENOUS
Status: DISCONTINUED | OUTPATIENT
Start: 2022-05-26 | End: 2022-05-26 | Stop reason: HOSPADM

## 2022-05-26 RX ORDER — ONDANSETRON 2 MG/ML
INJECTION INTRAMUSCULAR; INTRAVENOUS AS NEEDED
Status: DISCONTINUED | OUTPATIENT
Start: 2022-05-26 | End: 2022-05-26 | Stop reason: HOSPADM

## 2022-05-26 RX ORDER — SODIUM CHLORIDE 9 MG/ML
INJECTION, SOLUTION INTRAVENOUS
Status: COMPLETED | OUTPATIENT
Start: 2022-05-26 | End: 2022-05-26

## 2022-05-26 RX ORDER — MIDAZOLAM HYDROCHLORIDE 1 MG/ML
INJECTION, SOLUTION INTRAMUSCULAR; INTRAVENOUS
Status: COMPLETED | OUTPATIENT
Start: 2022-05-26 | End: 2022-05-26

## 2022-05-26 RX ORDER — SCOLOPAMINE TRANSDERMAL SYSTEM 1 MG/1
1 PATCH, EXTENDED RELEASE TRANSDERMAL
Status: DISCONTINUED | OUTPATIENT
Start: 2022-05-26 | End: 2022-05-27 | Stop reason: HOSPADM

## 2022-05-26 RX ORDER — SODIUM CHLORIDE 0.9 % (FLUSH) 0.9 %
5-40 SYRINGE (ML) INJECTION AS NEEDED
Status: DISCONTINUED | OUTPATIENT
Start: 2022-05-26 | End: 2022-05-27 | Stop reason: HOSPADM

## 2022-05-26 RX ORDER — PROPOFOL 10 MG/ML
INJECTION, EMULSION INTRAVENOUS AS NEEDED
Status: DISCONTINUED | OUTPATIENT
Start: 2022-05-26 | End: 2022-05-26 | Stop reason: HOSPADM

## 2022-05-26 RX ORDER — ROCURONIUM BROMIDE 10 MG/ML
INJECTION, SOLUTION INTRAVENOUS AS NEEDED
Status: DISCONTINUED | OUTPATIENT
Start: 2022-05-26 | End: 2022-05-26 | Stop reason: HOSPADM

## 2022-05-26 RX ORDER — FENTANYL CITRATE 50 UG/ML
INJECTION, SOLUTION INTRAMUSCULAR; INTRAVENOUS AS NEEDED
Status: DISCONTINUED | OUTPATIENT
Start: 2022-05-26 | End: 2022-05-26 | Stop reason: HOSPADM

## 2022-05-26 RX ORDER — ONDANSETRON 2 MG/ML
4 INJECTION INTRAMUSCULAR; INTRAVENOUS AS NEEDED
Status: DISCONTINUED | OUTPATIENT
Start: 2022-05-26 | End: 2022-05-26 | Stop reason: HOSPADM

## 2022-05-26 RX ORDER — PREGABALIN 75 MG/1
150 CAPSULE ORAL ONCE
Status: COMPLETED | OUTPATIENT
Start: 2022-05-26 | End: 2022-05-26

## 2022-05-26 RX ORDER — ONDANSETRON 2 MG/ML
4 INJECTION INTRAMUSCULAR; INTRAVENOUS
Status: DISPENSED | OUTPATIENT
Start: 2022-05-26 | End: 2022-05-27

## 2022-05-26 RX ORDER — DEXAMETHASONE SODIUM PHOSPHATE 4 MG/ML
INJECTION, SOLUTION INTRA-ARTICULAR; INTRALESIONAL; INTRAMUSCULAR; INTRAVENOUS; SOFT TISSUE AS NEEDED
Status: DISCONTINUED | OUTPATIENT
Start: 2022-05-26 | End: 2022-05-26 | Stop reason: HOSPADM

## 2022-05-26 RX ORDER — KETOROLAC TROMETHAMINE 30 MG/ML
30 INJECTION, SOLUTION INTRAMUSCULAR; INTRAVENOUS EVERY 6 HOURS
Status: COMPLETED | OUTPATIENT
Start: 2022-05-26 | End: 2022-05-27

## 2022-05-26 RX ORDER — SCOLOPAMINE TRANSDERMAL SYSTEM 1 MG/1
1 PATCH, EXTENDED RELEASE TRANSDERMAL
Status: COMPLETED | OUTPATIENT
Start: 2022-05-26 | End: 2022-05-26

## 2022-05-26 RX ORDER — DIPHENHYDRAMINE HCL 12.5MG/5ML
12.5 LIQUID (ML) ORAL
Status: DISCONTINUED | OUTPATIENT
Start: 2022-05-26 | End: 2022-05-27 | Stop reason: HOSPADM

## 2022-05-26 RX ADMIN — CEFAZOLIN SODIUM 2 G: 1 INJECTION, POWDER, FOR SOLUTION INTRAMUSCULAR; INTRAVENOUS at 15:49

## 2022-05-26 RX ADMIN — Medication 3 MG: at 08:13

## 2022-05-26 RX ADMIN — PREGABALIN 150 MG: 75 CAPSULE ORAL at 06:41

## 2022-05-26 RX ADMIN — Medication 1000 MG: at 06:41

## 2022-05-26 RX ADMIN — ONDANSETRON 4 MG: 2 INJECTION INTRAMUSCULAR; INTRAVENOUS at 16:04

## 2022-05-26 RX ADMIN — OXYCODONE 5 MG: 5 TABLET ORAL at 18:27

## 2022-05-26 RX ADMIN — DEXAMETHASONE SODIUM PHOSPHATE 8 MG: 4 INJECTION, SOLUTION INTRAMUSCULAR; INTRAVENOUS at 07:45

## 2022-05-26 RX ADMIN — ACETAMINOPHEN 650 MG: 325 TABLET ORAL at 18:25

## 2022-05-26 RX ADMIN — LIDOCAINE HYDROCHLORIDE 80 MG: 20 INJECTION, SOLUTION EPIDURAL; INFILTRATION; INTRACAUDAL; PERINEURAL at 07:38

## 2022-05-26 RX ADMIN — FAMOTIDINE 20 MG: 20 TABLET, FILM COATED ORAL at 10:51

## 2022-05-26 RX ADMIN — ONDANSETRON HYDROCHLORIDE 4 MG: 2 INJECTION, SOLUTION INTRAMUSCULAR; INTRAVENOUS at 07:45

## 2022-05-26 RX ADMIN — Medication 1 AMPULE: at 10:53

## 2022-05-26 RX ADMIN — WATER 2 G: 1 INJECTION INTRAMUSCULAR; INTRAVENOUS; SUBCUTANEOUS at 07:42

## 2022-05-26 RX ADMIN — ROCURONIUM BROMIDE 20 MG: 10 INJECTION INTRAVENOUS at 07:42

## 2022-05-26 RX ADMIN — FENTANYL CITRATE 50 MCG: 50 INJECTION, SOLUTION INTRAMUSCULAR; INTRAVENOUS at 07:53

## 2022-05-26 RX ADMIN — KETOROLAC TROMETHAMINE 30 MG: 30 INJECTION, SOLUTION INTRAMUSCULAR at 18:26

## 2022-05-26 RX ADMIN — SODIUM CHLORIDE, PRESERVATIVE FREE 10 ML: 5 INJECTION INTRAVENOUS at 18:26

## 2022-05-26 RX ADMIN — ASPIRIN 81 MG: 81 TABLET, COATED ORAL at 18:25

## 2022-05-26 RX ADMIN — GLYCOPYRROLATE 0.2 MG: 0.2 INJECTION, SOLUTION INTRAMUSCULAR; INTRAVENOUS at 08:13

## 2022-05-26 RX ADMIN — MIDAZOLAM HYDROCHLORIDE 2 MG: 1 INJECTION, SOLUTION INTRAMUSCULAR; INTRAVENOUS at 07:20

## 2022-05-26 RX ADMIN — FENTANYL CITRATE 25 MCG: 50 INJECTION, SOLUTION INTRAMUSCULAR; INTRAVENOUS at 09:15

## 2022-05-26 RX ADMIN — TRANEXAMIC ACID 1 G: 100 INJECTION, SOLUTION INTRAVENOUS at 07:42

## 2022-05-26 RX ADMIN — SCOPALAMINE 1 PATCH: 1 PATCH, EXTENDED RELEASE TRANSDERMAL at 07:46

## 2022-05-26 RX ADMIN — Medication 3 AMPULE: at 06:38

## 2022-05-26 RX ADMIN — FAMOTIDINE 20 MG: 20 TABLET, FILM COATED ORAL at 18:25

## 2022-05-26 RX ADMIN — GLYCOPYRROLATE 0.2 MG: 0.2 INJECTION, SOLUTION INTRAMUSCULAR; INTRAVENOUS at 07:25

## 2022-05-26 RX ADMIN — ROPIVACAINE HYDROCHLORIDE 20 ML: 5 INJECTION, SOLUTION EPIDURAL; INFILTRATION; PERINEURAL at 07:20

## 2022-05-26 RX ADMIN — SENNOSIDES AND DOCUSATE SODIUM 1 TABLET: 50; 8.6 TABLET ORAL at 18:25

## 2022-05-26 RX ADMIN — PROPOFOL 100 MCG/KG/MIN: 10 INJECTION, EMULSION INTRAVENOUS at 07:38

## 2022-05-26 RX ADMIN — SODIUM CHLORIDE, POTASSIUM CHLORIDE, SODIUM LACTATE AND CALCIUM CHLORIDE 25 ML/HR: 600; 310; 30; 20 INJECTION, SOLUTION INTRAVENOUS at 06:38

## 2022-05-26 RX ADMIN — SODIUM CHLORIDE 125 ML/HR: 9 INJECTION, SOLUTION INTRAVENOUS at 08:54

## 2022-05-26 RX ADMIN — POLYETHYLENE GLYCOL 3350 17 G: 17 POWDER, FOR SOLUTION ORAL at 10:51

## 2022-05-26 RX ADMIN — CELECOXIB 400 MG: 200 CAPSULE ORAL at 06:41

## 2022-05-26 RX ADMIN — ACETAMINOPHEN 650 MG: 325 TABLET ORAL at 12:51

## 2022-05-26 RX ADMIN — OXYCODONE 5 MG: 5 TABLET ORAL at 10:54

## 2022-05-26 RX ADMIN — SENNOSIDES AND DOCUSATE SODIUM 1 TABLET: 50; 8.6 TABLET ORAL at 10:51

## 2022-05-26 RX ADMIN — SUCCINYLCHOLINE CHLORIDE 200 MG: 20 INJECTION, SOLUTION INTRAMUSCULAR; INTRAVENOUS at 07:38

## 2022-05-26 RX ADMIN — PROPOFOL 150 MG: 10 INJECTION, EMULSION INTRAVENOUS at 07:38

## 2022-05-26 RX ADMIN — SODIUM CHLORIDE, PRESERVATIVE FREE 10 ML: 5 INJECTION INTRAVENOUS at 15:48

## 2022-05-26 RX ADMIN — ASPIRIN 81 MG: 81 TABLET, COATED ORAL at 10:51

## 2022-05-26 NOTE — PROGRESS NOTES
End of Shift Note    Bedside shift change report given to Jhon Duffy (oncoming nurse) by Balaji Gonzalez RN (offgoing nurse). Report included the following information SBAR, Kardex, Procedure Summary, Intake/Output and Recent Results    Shift worked:  7a-7p     Shift summary and any significant changes:    Ms. Day's BP has been elevated. She was not tolerating PO well. IV zofran given. Pain controlled with 5mg oxy. Will work again with PT tomorrow for better results. Did not tolerate today's PT well. Voiding. Concerns for physician to address: N/A   [facility] phone for oncoming shift:  2040     Activity:  Activity Level: Up with Assistance  Number times ambulated in hallways past shift: 0  Number of times OOB to chair past shift: 0    Cardiac:   Cardiac Monitoring: No      Cardiac Rhythm: Sinus Rhythm    Access:   Current line(s): PIV     Genitourinary:   Urinary status: voiding and external catheter    Respiratory:   O2 Device: None (Room air)  Chronic home O2 use?: NO  Incentive spirometer at bedside: YES       GI:  Last Bowel Movement Date: 05/26/22  Current diet:  ADULT DIET Regular  Passing flatus: YES  Tolerating current diet: YES       Pain Management:   Patient states pain is manageable on current regimen: YES    Skin:  Clemente Score: 20  Interventions: increase time out of bed, PT/OT consult, internal/external urinary devices and nutritional support     Patient Safety:  Fall Score:  Total Score: 3  Interventions: bed/chair alarm, assistive device (walker, cane, etc), gripper socks and pt to call before getting OOB  High Fall Risk: Yes    Length of Stay:  Expected LOS: - - -  Actual LOS: 0      Balaji Gonzalez RN

## 2022-05-26 NOTE — ANESTHESIA POSTPROCEDURE EVALUATION
Procedure(s):  LEFT TOTAL KNEE ARTHROPLASTY WITH NAVIGATION. general    Anesthesia Post Evaluation      Multimodal analgesia: multimodal analgesia used between 6 hours prior to anesthesia start to PACU discharge  Patient location during evaluation: PACU  Patient participation: complete - patient participated  Level of consciousness: awake and alert  Pain management: adequate  Airway patency: patent  Anesthetic complications: no  Cardiovascular status: acceptable, hemodynamically stable and blood pressure returned to baseline  Respiratory status: acceptable and nasal cannula  Hydration status: euvolemic  Post anesthesia nausea and vomiting:  none  Final Post Anesthesia Temperature Assessment:  Normothermia (36.0-37.5 degrees C)      INITIAL Post-op Vital signs:   Vitals Value Taken Time   /72 05/26/22 0945   Temp 36 °C (96.8 °F) 05/26/22 0930   Pulse 61 05/26/22 0956   Resp 17 05/26/22 0956   SpO2 95 % 05/26/22 0956   Vitals shown include unvalidated device data.

## 2022-05-26 NOTE — ANESTHESIA PREPROCEDURE EVALUATION
Anesthetic History     PONV     Comments: Hx of prolonged emergence     Review of Systems / Medical History  Patient summary reviewed, nursing notes reviewed and pertinent labs reviewed    Pulmonary  Within defined limits        Undiagnosed apnea    Pertinent negatives: No smoker     Neuro/Psych         Headaches     Cardiovascular    Hypertension              Exercise tolerance: >4 METS  Comments: Normal sinus rhythm    GI/Hepatic/Renal  Within defined limits              Endo/Other        Morbid obesity, arthritis and cancer  Pertinent negatives: No diabetes   Other Findings   Comments: Breast CA           Physical Exam    Airway  Mallampati: III  TM Distance: 4 - 6 cm  Neck ROM: normal range of motion   Mouth opening: Normal    Comments: All prior airways performed with videolaryngoscopy  Cardiovascular  Regular rate and rhythm,  S1 and S2 normal,  no murmur, click, rub, or gallop  Rhythm: regular  Rate: normal         Dental  No notable dental hx       Pulmonary  Breath sounds clear to auscultation               Abdominal  GI exam deferred       Other Findings            Anesthetic Plan    ASA: 3  Anesthesia type: general    Monitoring Plan: BIS  Post-op pain plan if not by surgeon: peripheral nerve block single and regional    Induction: Intravenous  Anesthetic plan and risks discussed with: Patient      Hx of PONV; TIVA + (2) other antiemetics.

## 2022-05-26 NOTE — H&P
Date of Surgery Update:  Juancarlos Phipps was seen and examined on the day of surgery prior to the procedure. There were no significant clinical changes since the completion of the History and Physical.    Exam today prior to surgery showed no acute cardiac findings, no respiratory difficulty, and no abdominal complaints or pain. This patient is a candidate for TXA. The patient was counseled at length about the risks of nataliia Covid-19 during their perioperative period and any recovery window from their procedure. The patient was made aware that nataliia Covid-19  may worsen their prognosis for recovering from their procedure and lend to a higher morbidity and/or mortality risk. All material risks, benefits, and reasonable alternatives including postponing the procedure were discussed. The patient does wish to proceed with the procedure at this time. Documentation of Medical Necessity:    Symptoms: pain with activity and at rest, antalgia, interferes with ADLs    Conservative Treatment: activity modification, multiple medications, injection    Physical Findings: pain at joint line, antalgia on ambulation, crepitation    Imaging: significant OA, sclerosis and osteophytes    Indications:   Failure of conservative treatments with daily pain and functional limitations. Appropriate imaging demonstrating significant disease. Appropriate physical findings consistent with significant degenerative joint disease. All pertinent risks, benefits, and alternatives to operative management including continued conservative care were explained at length. The patient has elected to proceed with appropriately indicated and medically necessary total joint arthroplasty. They understand no guarantees can be given about the outcome.       Signed By: MILY Beckham     May 26, 2022 7:51 AM

## 2022-05-26 NOTE — PROGRESS NOTES
Problem: Mobility Impaired (Adult and Pediatric)  Goal: *Acute Goals and Plan of Care (Insert Text)  Description: FUNCTIONAL STATUS PRIOR TO ADMISSION: Patient was independent and active without use of DME.    HOME SUPPORT PRIOR TO ADMISSION: The patient lived with  and 26 y/o son who has Autism. Physical Therapy Goals  Initiated 5/26/2022    1. Patient will move from supine to sit and sit to supine  in bed with modified independence within 4 days. 2. Patient will perform sit to stand with modified independence within 4 days. 3. Patient will ambulate with modified independence for 120 feet with the least restrictive device within 4 days. 4. Patient will ascend/descend 4 stairs with 1 handrail(s) with modified independence within 4 days. 5. Patient will perform home exercise program per protocol with independence within 4 days. 6. Patient will demonstrate AROM 0-90 degrees in operative joint within 4 days. Outcome: Progressing Towards Goal   PHYSICAL THERAPY EVALUATION  Patient: Gideon Banda (45 y.o. female)  Date: 5/26/2022  Primary Diagnosis: Primary osteoarthritis of left knee [M17.12]  Osteoarthritis of left knee [M17.12]  Procedure(s) (LRB):  LEFT TOTAL KNEE ARTHROPLASTY WITH NAVIGATION (Left) Day of Surgery   Precautions:   Fall    ASSESSMENT  Based on the objective data described below, the patient presents with decreased independence with functional mobility post-op day 0 L TKA. She is received supine in bed. Supine BP is elevated and O2 sats are stable. Patient is able to transition supine to sit with HOB elevated and Min A. She demonstrates fair sitting balance leaning to the right and attempting to off weight through bilalteral UE. Patient reports altered sensation in the L LE while in sitting. RW is placed in front of her and L LE is blocked to prevent buckling. Patient performs sit to stand with VC for hand placement and Mod A. L LE flexes in to therapist leg.  Patient is able to reposition her footing but L LE cannot maintain knee extension. Avis [place] comes to sit. She is Mod A x2 for sit to supine and rolling. She demonstrates the ability to bridge with her R LE in order to place bedpan. Patient rolls R and L with VC and use of bed rail to complete hygiene. Current Level of Function Impacting Discharge (mobility/balance): Mod A 1-2, L LE buckling in stance    Functional Outcome Measure: The patient scored 45/100 on the Barthel outcome measure. Other factors to consider for discharge: medical stability, increased gait and transfers training     Patient will benefit from skilled therapy intervention to address the above noted impairments. PLAN :  Recommendations and Planned Interventions: bed mobility training, transfer training, gait training, therapeutic exercises, neuromuscular re-education, edema management/control, patient and family training/education, and therapeutic activities      Frequency/Duration: Patient will be followed by physical therapy:  twice daily to address goals.     Recommendation for discharge: (in order for the patient to meet his/her long term goals)  Physical therapy at least 2 days/week in the home     This discharge recommendation:  Has not yet been discussed the attending provider and/or case management    IF patient discharges home will need the following DME: rolling walker         SUBJECTIVE:   Patient stated i'm going to do my best.    OBJECTIVE DATA SUMMARY:   HISTORY:    Past Medical History:   Diagnosis Date    Adverse effect of anesthesia     slow to wake after anesthesia     Arthritis     At risk for sleep apnea 04/25/2022    SHAHID 6 w/ reported witnessed apnea while sleeping     Breast [facility] ([facility] [address]     Rt 2016    Cancer ([facility] [address]     right breast    COVID-19 03/29/2022    Elevated hemoglobin A1c 04/25/2022    A1C-6.1    Endometriosis     Headache     Hypertension     Paget's disease of breast ([facility] [address]     history of     Postoperative nausea and vomiting      Past Surgical History:   Procedure Laterality Date    HX BREAST LUMPECTOMY Right 10/6/2016    RIGHT BREAST CENTRAL LUMPECTOMY, RIGHT SENTINEL NODE BIOPSY, BILATERAL BREAST REDUCTION performed by Yanci Hernandes MD at [facility]    HX BREAST RECONSTRUCTION Right 11/18/2016    BREAST RECONSTRUCTION performed by Gracia Guardado MD at [facility]    HX BREAST RECONSTRUCTION Right 5/3/2017    EXCHANGE RIGHT BREAST TISSUE EXPANDER FOR BREAST IMPLANT  performed by Gracia Guardado MD at [facility]    HX BREAST REDUCTION Bilateral 10/6/2016    BREAST REDUCTION performed by Gracia Guardado MD at [facility]    HX GYN      surgery for endometriosis    HX HYSTERECTOMY      Dr. Christianne Kaufman [facility]  for bleeding and endometriosis.     HX MASTECTOMY Right 11/18/2016    RIGHT SKIN SPARING MASTECTOMY,RIGHT BREAST RECONSTRUCTION WITH TISSUE EXPANDER ALLODERM performed by Yanci Hernandes MD at [facility]    HX PARTIAL HYSTERECTOMY      HX TUBAL LIGATION         Personal factors and/or comorbidities impacting plan of care: please see above    Home Situation  Home Environment: Private residence  # Steps to Enter: 3  Rails to Enter: Yes  Hand Rails : Right  One/Two Story Residence: Two story  # of Interior Steps: 15  Interior Rails: Right  Living Alone: No  Support Systems: Spouse/Significant Other,Child(shaji)  Patient Expects to be Discharged to[de-identified] Home  Current DME Used/Available at Home: [address] [facility], [address], straight,Raised toilet seat,Walker, rollator  Tub or Shower Type: Tub/Shower combination    EXAMINATION/PRESENTATION/DECISION MAKING:   Critical Behavior:  Neurologic State: Alert,Appropriate for age           Hearing:     Skin:    Edema:   Range Of Motion:  AROM: Generally decreased, functional           PROM: Generally decreased, functional           Strength:    Strength: Grossly decreased, non-functional                    Tone & Sensation:   Tone: Abnormal              Sensation: Impaired Coordination:  Coordination: Generally decreased, functional  Vision:      Functional Mobility:  Bed Mobility:     Supine to Sit: Moderate assistance;Bed Modified  Sit to Supine: Moderate assistance;Assist x2  Scooting: Minimum assistance  Transfers:  Sit to Stand: Moderate assistance  Stand to Sit: Moderate assistance                       Balance:   Sitting: Impaired; Without support  Sitting - Static: Fair (occasional)  Sitting - Dynamic: Not tested  Standing: Impaired; With support  Standing - Static: Constant support;Poor  Standing - Dynamic : Not tested  Ambulation/Gait Training:                                                         Stairs: Therapeutic Exercises:       Functional Measure:  Barthel Index:    Bathin  Bladder: 10  Bowels: 10  Groomin  Dressin  Feeding: 10  Mobility: 0  Stairs: 0  Toilet Use: 5  Transfer (Bed to Chair and Back): 0  Total: 45/100       The Barthel ADL Index: Guidelines  1. The index should be used as a record of what a patient does, not as a record of what a patient could do. 2. The main aim is to establish degree of independence from any help, physical or verbal, however minor and for whatever reason. 3. The need for supervision renders the patient not independent. 4. A patient's performance should be established using the best available evidence. Asking the patient, friends/relatives and nurses are the usual sources, but direct observation and common sense are also important. However direct testing is not needed. 5. Usually the patient's performance over the preceding 24-48 hours is important, but occasionally longer periods will be relevant. 6. Middle categories imply that the patient supplies over 50 per cent of the effort. 7. Use of aids to be independent is allowed.     Score Interpretation (from [address] [place] [address])    Independent   60-79 Minimally independent   40-59 Partially dependent   20-39 Very dependent   <20 Totally dependent Aristides Leo., Barthel, D.W. ([id number]). Functional evaluation: the Barthel Index. [address] ([address] (1997). The Barthel activities of daily living index: self-reporting versus actual performance in the old (> or = 75 years). Journal of [address] [place] [address] 45(7), [facility], CE, Usman Alejandre., Shanita Landaverde. (1999). Measuring the change in disability after inpatient rehabilitation; comparison of the responsiveness of the Barthel Index and Functional Dennis Measure. Journal of Neurology, Neurosurgery, and Psychiatry, 66(4), 655-413. Raul Reid, N.J.A, ZI Mccarty, & Martha Barth MROSS. (2004) Assessment of post-stroke quality of life in cost-effectiveness studies: The usefulness of the Barthel Index and the EuroQoL-5D. Quality of Life Research, 15, 995-23            Physical Therapy Evaluation Charge Determination   History Examination Presentation Decision-Making   MEDIUM  Complexity : 1-2 comorbidities / personal factors will impact the outcome/ POC  LOW Complexity : 1-2 Standardized tests and measures addressing body structure, function, activity limitation and / or participation in recreation  MEDIUM Complexity : Evolving with changing characteristics  Other outcome measures Barthel  HIGH       Based on the above components, the patient evaluation is determined to be of the following complexity level: MEDIUM    Pain Rating:      Activity Tolerance:   Fair    After treatment patient left in no apparent distress:   Supine in bed, Call bell within reach, Caregiver / family present, and Side rails x 3    COMMUNICATION/EDUCATION:   The patients plan of care was discussed with: Registered nurse. Fall prevention education was provided and the patient/caregiver indicated understanding., Patient/family have participated as able in goal setting and plan of care. , and Patient/family agree to work toward stated goals and plan of care.    Thank you for this referral.  Annika Sousa, PT   Time Calculation: 30 mins

## 2022-05-26 NOTE — PROGRESS NOTES
ORTHO PROGRESS NOTE  Post Op day: Day of Surgery    May 26, 2022 6:16 PM     Sy High    Attending Physician: Treatment Team: Attending Provider: Miguelangel Song MD; Primary Nurse: Marbella Morse RN     Drowsy, slow progress with PT. Nauseated. Vital Signs:    Patient Vitals for the past 8 hrs:   BP Temp Pulse Resp SpO2   05/26/22 1628 (!) 142/81 98 °F (36.7 °C) 91 16 92 %   05/26/22 1519 (!) 151/82 98.6 °F (37 °C) (!) 101 14 95 %   05/26/22 1419 (!) 155/94 98.4 °F (36.9 °C) 98 16 94 %   05/26/22 1319 (!) 147/95 98.6 °F (37 °C) 92 16 95 %   05/26/22 1219 (!) 164/98 98.4 °F (36.9 °C) 92 16 96 %   05/26/22 1119 (!) 155/93 97.6 °F (36.4 °C) 72 16 97 %   05/26/22 1019 (!) 146/91 97.6 °F (36.4 °C) 74 16 96 %     BMI (calculated): 40.9 (05/26/22 0643)    LAB:    No results for input(s): HCT, HGB, HCTEXT, HGBEXT in the last 72 hours. Objective: General: alert, cooperative, no distress. Cardiac: Normal S1&S2, no murmur. Resp: BBS clear, no wheezing. Gastrointestinal:  Soft, non-tender. Neuro/Vascular: CNS Intact. Sensation stable. Brisk cap refill, 2+ pulses UE/LE  Musculoskeletal:  FROM UE/LE. Violet's sign negative in bilateral lower extremities. Calves soft, supple, non-tender upon palpation or with passive stretch. Skin: Incision - clean, dry and intact. No significant erythema or swelling. Dressing: clean, dry, and intact         PT/OT:   Gait:                      Assessment:    s/p Procedure(s):  LEFT TOTAL KNEE ARTHROPLASTY WITH NAVIGATION    Active Problems:    Osteoarthritis of left knee (5/26/2022)         Plan:   Scopolamine patch  -  Continue PT/OT  -  Continue established methods of pain control, minimize narcotics   -  VTE Prophylaxes - TEDS &/or SCDs, bASA       Discharge To: Home tomorrow     Dr. Brittany Carrasco aware and agree with plan.      Signed By: MILY Rehman    Physician Assistant, [address] [place] [address]

## 2022-05-26 NOTE — OP NOTES
PREOPERATIVE DIAGNOSIS:  Left knee degenerative joint disease    POSTOPERATIVE DIAGNOSIS:  Same    PROCEDURE: Total knee replacement with imageless computer navigation    Implants Used: [employer] Truliant Pressfit Femur size 3.5CR, Truliant Pressfit Tibia size 3.5, 9mm CRC poly, two 6.5mm screws    Implant Name Type Inv. Item Serial No.  Lot No. LRB No. Used Action   SCREW BONE L40MM DIA6.5MM FOR NOVATION CRWN CUP ACET SHELL - [id number]  SCREW BONE L40MM DIA6.5MM FOR NOVATION CRWN CUP ACET SHELL [id number] [employer]WD N/A Left 1 Implanted   SCREW BONE L40MM DIA6.5MM FOR NOVATION CRWN CUP ACET SHELL - [id number]  SCREW BONE L40MM DIA6.5MM FOR NOVATION CRWN CUP ACET SHELL [id number] [employer]WD N/A Left 1 Implanted   COMPONENT FEM CR 3.5 LT KNEE POROUS TRULIANT - [id number]  COMPONENT FEM CR 3.5 LT KNEE POROUS TRULIANT [id number] [employer]WD N/A Left 1 Implanted   COMPONENT TIB TY 3.5F/3.5T KNEE POROUS TRULIANT - [id number]  COMPONENT TIB TY 3.5F/3.5T KNEE POROUS TRULIANT [id number] [employer]WD N/A Left 1 Implanted   INSERT TIB CR 3.5 9 MM TRULIANT - [id number]  INSERT TIB CR 3.5 9 MM TRULIANT [id number] [employer]WD N/A Left 1 Implanted       Anesthesia: General + block / local    Surgeon: Mirian Romo     Assistant: MILY Ramirez (Performing all or most of the following assistant-at-surgery services including but not limited to: proper patient positioning, sterile/prep and draping, placement of instruments/trackers, operative exposure, minor portions of bone / soft tissue excision, final irrigation and debridement, deep and superficial closure, application of final dressings)    EBL: minimal    Drains: none     Specimens: none     Complications: none     Condition: stable to PACU     INDICATIONS: Longstanding knee pain unresponsive to conservative measures. The risks, benefits, and alternatives to the procedure were explained to the patient and they wished to proceed.  They understood no guarantees could be given about the outcome of the procedure. DESCRIPTION OF PROCEDURE:  The patient was brought in to the operating room and placed supine on a standard OR table. Anesthesia was provided by the anesthesia team without difficulty. A thigh tourniquet was applied to the operative limb which was then prepped and draped in the usual fashion. Pre-operative antibiotics were administered. An appropriate time-out was performed. The limb was exsanguinated with an Esmarch and tourniquet inflated. A standard medial parapatellar arthrotomy was used. The fat pad was excised. The patella was then subluxed laterally and attention turned to the femur. Navigation was used after the registration sequence to make the appropriate distal femoral cut, and drill for the lugs of the 4-in-1 guide. The femoral cut was confirmed with navigation. The ACL was excised along with the anterior portions of the menisci. The  4-in-1 guide position was confirmed with navigation and pinned in parallel to the epicondylar axis and perpendicular to Aileen's line. The anterior, posterior and chamfer cuts were performed, protecting the PCL and collateral ligaments at all times. The posterior capsule was cleared and any osteophytes were removed. Attention was then turned to the tibia. The navigation system was used to perform the tibial cut perpendicular to the mechanical axis. The remainder of the menisci were excised and the tibia sized. The patella was noted to be in acceptable condition and was not resurfaced. Selective denervation was performed. Trial components were then placed and the knee taken through a range of motion and found to have excellent range of motion, stability, and ligamentous balance. The rotation of the tibial tray was marked and the trials removed. The trial tibial tray was reinserted and the tibial prepared for the keel of the tray.       The final implants were then impacted into place and two 6.5mm tibial screws placed. The tibial tray liner was inserted into the locking mechanism and the knee reduced. It was again taken through a range of motion and found to be stable in all planes with excellent tracking of the patella. The wound was thoroughly lavaged. The extensor mechanism was repaired with heavy interrupted suture and a running stitch. Periarticular injection was performed. Skin closure was then performed in layers. Sterile compressive dressings were applied. The patient was awakened, moved to the stretcher and taken to the recovery room in stable condition. At the conclusion of the procedure, all counts were correct. There no immediate complications.

## 2022-05-26 NOTE — ANESTHESIA PROCEDURE NOTES
Peripheral Block    Start time: 5/26/2022 7:15 AM  End time: 5/26/2022 7:20 AM  Performed by: Monisha Mcknight MD  Authorized by: Monisha Mcknight MD       Pre-procedure: Indications: at surgeon's request and post-op pain management    Preanesthetic Checklist: patient identified, risks and benefits discussed, site marked, timeout performed, anesthesia consent given and patient being monitored    Timeout Time: 07:14 EDT          Block Type:   Block Type: Adductor canal block  Laterality:  Left  Monitoring:  Standard ASA monitoring, continuous pulse ox, frequent vital sign checks, heart rate, responsive to questions and oxygen  Injection Technique:  Single shot  Procedures: ultrasound guided    Patient Position: supine  Prep: chlorhexidine    Location:  Lower thigh  Needle Type:  Stimuplex  Needle Gauge:  22 G  Needle Localization:  Ultrasound guidance  Medication Injected:  Midazolam (VERSED) injection, 2 mg  ropivacaine (PF) (NAROPIN)(0.5%) 5 mg/mL injection, 20 mL  Med Admin Time: 5/26/2022 7:20 AM    Assessment:  Number of attempts:  1  Injection Assessment:  Incremental injection every 5 mL, local visualized surrounding nerve on ultrasound, negative aspiration for blood, no intravascular symptoms, no paresthesia and ultrasound image on chart  Patient tolerance:  Patient tolerated the procedure well with no immediate complications  Under ultrasound guidance, a 22 gauge needle was inserted and placed in close proximity to the nerve. Ultrasound was also used to visualize the spread of the anesthetic in close proximity to the nerve being blocked. The nerve appeared anatomicaly normal.  A permanent ultrasound image was saved in the patient's record.

## 2022-05-26 NOTE — PERIOP NOTES
0874 Handoff Report from Operating Room to PACU    Report received from SHAHEEN Pulliam RN and KENDRA Krishnamurthy CRNA regarding [employer]. Surgeon(s):  Deandre Garcia MD  And Procedure(s) (LRB):  LEFT TOTAL KNEE ARTHROPLASTY WITH NAVIGATION (Left)  confirmed   with allergies and dressings discussed. Anesthesia type, drugs, patient history, complications, estimated blood loss, vital signs, intake and output, and last pain medication, lines, reversal medications and temperature were reviewed. 1000 TRANSFER - OUT REPORT:    Verbal report given to Maxine PLUNKETT(name) on [employer]  being transferred to Ortho(unit) for routine post - op       Report consisted of patients Situation, Background, Assessment and   Recommendations(SBAR). Information from the following report(s) SBAR, Kardex, OR Summary, Intake/Output, MAR and Cardiac Rhythm SR was reviewed with the receiving nurse. Lines:   Peripheral IV 05/26/22 Anterior; Left Forearm (Active)   Site Assessment Clean, dry, & intact 05/26/22 0842   Phlebitis Assessment 0 05/26/22 0842   Infiltration Assessment 0 05/26/22 0842   Dressing Status Clean, dry, & intact 05/26/22 0842   Dressing Type Transparent;Tape 05/26/22 0842   Hub Color/Line Status Pink; Infusing 05/26/22 4675        Opportunity for questions and clarification was provided.       Patient transported with:   [employer]

## 2022-05-26 NOTE — PROGRESS NOTES
TRANSFER - IN REPORT:    Verbal report received from [facility] RN(name) on [employer]  being received from PACU(unit) for routine post - op      Report consisted of patients Situation, Background, Assessment and   Recommendations(SBAR). Information from the following report(s) SBAR, Kardex, OR Summary, Procedure Summary, Intake/Output and Recent Results was reviewed with the receiving nurse. Opportunity for questions and clarification was provided. Assessment completed upon patients arrival to unit and care assumed.

## 2022-05-26 NOTE — DISCHARGE INSTRUCTIONS
Discharge Instructions:  Marcy Escobarsunday    Surgery: LEFT TOTAL KNEE ARTHROPLASTY WITH NAVIGATION  Surgeon:   Anisa Guevara MD  Surgery Date:  5/26/2022    To relieve pain:   Use ice/gel packs.    -Put the ice pack directly over the wound, or anywhere you are hurting or swollen.   -To control pain and swelling, keep ice on regularly, especially after physical activity.  -The packs should stay cold for 3-4 hours. When it is not cold anymore, rotate with the packs in the freezer.  Elevate your leg. This will also keep swelling down.  Rest for at least 20 minutes between activity or exercises. To keep track of your pain medications, write down what you take and when you take it.  The last dose of pain medication you got in the hospital was:     Medication    Dose    Date & Time      Choose your medications based on the pain scale below:     To keep your pain under control, take Tylenol every 6 hours for 14 days - even if you feel like you dont need it.  For mild to moderate pain (4-6 on pain scale), take one pain pill every 4 hours or as instructed.  For severe pain (7-10 on pain scale), take two pain pills every 4 hours or as instructed.  To prevent nausea, take your pain medications with food. Pain Scale              As your pain lessens:     Slowly start taking less pain medication. You may do this by waiting longer between doses or by taking smaller doses.  Stop using the pain medications as soon as you no longer need it, usually in 2-3 weeks. Aspirin   To prevent blood clots, you will need to take Aspirin 81 mg twice a day for 30 days. To prevent stomach upset or bleeding:   Do not take non-steroidal anti-inflammatory medications (Ibuprofen, Advil, Motrin, Naproxen, etc.)    Take Pepcid 20 mg twice a day, or a similar home medication, while you are taking a blood thinner.           Keep your waterproof dressing in place. It will be removed by your surgeon during your follow-up appointment in 2 weeks.  You may need to change the dressing if you are having drainage to where the dressing is no longer intact. You will be given an extra dressing to use at home.  You will have some swelling, warmth, and bruising around the incision and up and down the leg after surgery. This will may get worse in the first few days you are home and will slowly get better over the next few weeks.  You may shower with this dressing over your wound. After showering pat the dressing dry.  DO NOT's:   Do not rub your surgical wound   Do not put lotion or oils on your wound.  Do not take a tub bath or go swimming until your doctor says it is ok. To increase and promote healing:   Stop Smoking (or at least cut back on smoking).  Eat a well-balanced diet. High in protein and Vitamin C.      If you have a poor appetite, supplement your diet by drinking Ensure, Glucerna or Baudette Instant Breakfast for the next 30 days      If you are a diabetic, control you blood sugars to prevent infection and help your wound heal.                     Prevent Infection:     Wash your hands                       -This is the most important thing you or your caregiver can do. -Wash your hands with soap and water (or use the hand ) before you touch any wounds.  Shower  -Use the antibacterial soap we gave you when you take a shower.   -Shower with this soap until your wounds are healed.  Use Clean Sheets   -Put freshly cleaned sheets on your bed after surgery.   -To keep the surgery site clean, do not allow pets to sleep with you while your wound is still healing.  To prevent constipation, stay active and drink plenty of fluid.  While using pain medications, you should also take stool softeners and laxatives, such as Pericolace and Miralax.         If you are having too many bowel movements, then you may need to stop taking the laxatives.  You should have a bowel movement 3-4 days after surgery and then at least every other day while on pain medication.  To improve your recovery, you must be active!  Use your walker and take short walks (in your home) about every 2 hours during the day.  Try to increase how far you walk each day.  You can put as much weight on your leg as you can tolerate while walking.  Home health physical therapy will come to your home the day after you leave the hospital. The therapist will visit about 4 times over the next couple of weeks to teach you exercises, how to get out of bed and how to safely walk in your home.  NO DRIVING until your surgeon tells you it is ok.  You can return to work when cleared by a physician. Please call your physician immediately if you have:   Constant bleeding from your wound.  Increasing redness or swelling around your wound (some warmth, bruising and swelling is normal).  Change in wound drainage (increase in amount, color, or bad smell).  Change in mental status (unusual behavior).  Temperature over 101.5 degrees Fahrenheit    Pain or redness in the calf (back of your lower leg)   Increased swelling of the thigh, ankle, calf, or foot. Emergency! CALL 911 if you have:   Shortness of breath   Chest pain when you cough or taking a deep breath         Please call your surgeons office at [phone] for a follow up appointment in 2 weeks.  You should call as soon as you get home or the next day after discharge.  If you have questions or concerns during normal business hours, you may reach Dr. Tracy Pena' Team at [phone].         Instructions for 24 hours after receiving General Anesthesia or Intravenous Sedation,   and while taking prescription Narcotics    Common side effects associated with each of these medications includes:  - Drowsiness, dizziness, euphoria, sleepiness or confusion  - Impaired memory recall  - Unsteady gait, loss of fine muscle control and delayed reaction time  - Visual disturbances, difficulty focusing, blurred vision    You may experience some of these side effects or you may not be aware of subtle changes in your behavior or reaction time. Because you received these medications, we are giving you the following instructions. Discharge Instructions:   - Someone should be with you for the next 24 hours  - For your own safety, a responsible adult must drive you home  - Do not consume alcoholic beverages   - Do not make important personal, legal or business decisions for 24 hours  - Move slowly and carefully, do not make sudden position changes. Be alert for dizziness or lightheadedness and move accordingly. - Do not operate equipment for 24 hours - [employer], power tools, Kitchen accessories: stove, etc.  - If you have not urinated within 8 hours after discharge, please contact your surgeon's office.

## 2022-05-27 VITALS
HEART RATE: 86 BPM | BODY MASS INDEX: 40.96 KG/M2 | RESPIRATION RATE: 16 BRPM | TEMPERATURE: 98.7 F | HEIGHT: 65 IN | WEIGHT: 245.81 LBS | DIASTOLIC BLOOD PRESSURE: 96 MMHG | OXYGEN SATURATION: 100 % | SYSTOLIC BLOOD PRESSURE: 133 MMHG

## 2022-05-27 LAB
ANION GAP SERPL CALC-SCNC: 8 MMOL/L (ref 5–15)
BUN SERPL-MCNC: 12 MG/DL (ref 6–20)
BUN/CREAT SERPL: 13 (ref 12–20)
CALCIUM SERPL-MCNC: 8.6 MG/DL (ref 8.5–10.1)
CHLORIDE SERPL-SCNC: 105 MMOL/L (ref 97–108)
CO2 SERPL-SCNC: 25 MMOL/L (ref 21–32)
CREAT SERPL-MCNC: 0.89 MG/DL (ref 0.55–1.02)
GLUCOSE SERPL-MCNC: 163 MG/DL (ref 65–100)
HGB BLD-MCNC: 12.4 G/DL (ref 11.5–16)
POTASSIUM SERPL-SCNC: 3.8 MMOL/L (ref 3.5–5.1)
SODIUM SERPL-SCNC: 138 MMOL/L (ref 136–145)

## 2022-05-27 PROCEDURE — 85018 HEMOGLOBIN: CPT

## 2022-05-27 PROCEDURE — 36415 COLL VENOUS BLD VENIPUNCTURE: CPT

## 2022-05-27 PROCEDURE — 80048 BASIC METABOLIC PNL TOTAL CA: CPT

## 2022-05-27 PROCEDURE — 97530 THERAPEUTIC ACTIVITIES: CPT

## 2022-05-27 PROCEDURE — 97116 GAIT TRAINING THERAPY: CPT

## 2022-05-27 PROCEDURE — 74011000250 HC RX REV CODE- 250: Performed by: PHYSICIAN ASSISTANT

## 2022-05-27 PROCEDURE — 74011250636 HC RX REV CODE- 250/636: Performed by: PHYSICIAN ASSISTANT

## 2022-05-27 PROCEDURE — 74011250637 HC RX REV CODE- 250/637: Performed by: ORTHOPAEDIC SURGERY

## 2022-05-27 PROCEDURE — 74011250637 HC RX REV CODE- 250/637: Performed by: PHYSICIAN ASSISTANT

## 2022-05-27 RX ORDER — AMOXICILLIN 250 MG
1 CAPSULE ORAL 2 TIMES DAILY
Qty: 14 TABLET | Refills: 0 | Status: SHIPPED | OUTPATIENT
Start: 2022-05-27 | End: 2022-06-03

## 2022-05-27 RX ORDER — OXYCODONE HYDROCHLORIDE 5 MG/1
5-10 TABLET ORAL
Qty: 40 TABLET | Refills: 0 | Status: SHIPPED | OUTPATIENT
Start: 2022-05-27 | End: 2022-06-03

## 2022-05-27 RX ORDER — FAMOTIDINE 20 MG/1
20 TABLET, FILM COATED ORAL 2 TIMES DAILY
Qty: 60 TABLET | Refills: 0 | Status: SHIPPED | OUTPATIENT
Start: 2022-05-27 | End: 2022-06-26

## 2022-05-27 RX ORDER — POLYETHYLENE GLYCOL 3350 17 G/17G
17 POWDER, FOR SOLUTION ORAL DAILY
Qty: 7 PACKET | Refills: 0 | Status: SHIPPED | OUTPATIENT
Start: 2022-05-28 | End: 2022-06-04

## 2022-05-27 RX ORDER — ASPIRIN 81 MG/1
81 TABLET ORAL 2 TIMES DAILY
Qty: 60 TABLET | Refills: 0 | Status: SHIPPED | OUTPATIENT
Start: 2022-05-27 | End: 2022-06-26

## 2022-05-27 RX ADMIN — Medication 1 AMPULE: at 00:14

## 2022-05-27 RX ADMIN — ACETAMINOPHEN 650 MG: 325 TABLET ORAL at 05:24

## 2022-05-27 RX ADMIN — CEFAZOLIN SODIUM 2 G: 1 INJECTION, POWDER, FOR SOLUTION INTRAMUSCULAR; INTRAVENOUS at 00:14

## 2022-05-27 RX ADMIN — OXYCODONE 5 MG: 5 TABLET ORAL at 12:33

## 2022-05-27 RX ADMIN — KETOROLAC TROMETHAMINE 30 MG: 30 INJECTION, SOLUTION INTRAMUSCULAR at 12:31

## 2022-05-27 RX ADMIN — KETOROLAC TROMETHAMINE 30 MG: 30 INJECTION, SOLUTION INTRAMUSCULAR at 05:25

## 2022-05-27 RX ADMIN — DEXAMETHASONE SODIUM PHOSPHATE 10 MG: 4 INJECTION, SOLUTION INTRAMUSCULAR; INTRAVENOUS at 08:56

## 2022-05-27 RX ADMIN — KETOROLAC TROMETHAMINE 30 MG: 30 INJECTION, SOLUTION INTRAMUSCULAR at 00:14

## 2022-05-27 RX ADMIN — ASPIRIN 81 MG: 81 TABLET, COATED ORAL at 08:56

## 2022-05-27 RX ADMIN — ACETAMINOPHEN 650 MG: 325 TABLET ORAL at 00:13

## 2022-05-27 RX ADMIN — SODIUM CHLORIDE, PRESERVATIVE FREE 10 ML: 5 INJECTION INTRAVENOUS at 05:24

## 2022-05-27 RX ADMIN — OXYCODONE 5 MG: 5 TABLET ORAL at 08:57

## 2022-05-27 RX ADMIN — Medication 1 AMPULE: at 09:03

## 2022-05-27 RX ADMIN — FAMOTIDINE 20 MG: 20 TABLET, FILM COATED ORAL at 08:56

## 2022-05-27 RX ADMIN — SODIUM CHLORIDE, PRESERVATIVE FREE 10 ML: 5 INJECTION INTRAVENOUS at 12:31

## 2022-05-27 RX ADMIN — ACETAMINOPHEN 650 MG: 325 TABLET ORAL at 12:31

## 2022-05-27 RX ADMIN — SENNOSIDES AND DOCUSATE SODIUM 1 TABLET: 50; 8.6 TABLET ORAL at 08:56

## 2022-05-27 RX ADMIN — POLYETHYLENE GLYCOL 3350 17 G: 17 POWDER, FOR SOLUTION ORAL at 08:56

## 2022-05-27 RX ADMIN — SODIUM CHLORIDE, PRESERVATIVE FREE 10 ML: 5 INJECTION INTRAVENOUS at 00:16

## 2022-05-27 NOTE — PROGRESS NOTES
Problem: Falls - Risk of  Goal: *Absence of Falls  Description: Document Richar Parra Fall Risk and appropriate interventions in the flowsheet.   Outcome: Progressing Towards Goal  Note: Fall Risk Interventions:  Mobility Interventions: Assess mobility with egress test         Medication Interventions: Patient to call before getting OOB    Elimination Interventions: Call light in reach

## 2022-05-27 NOTE — PROGRESS NOTES
Problem: Patient Education: Go to Patient Education Activity  Goal: Patient/Family Education  Outcome: Resolved/Met     Problem: Pain  Goal: *Control of Pain  Outcome: Resolved/Met     Problem: Patient Education: Go to Patient Education Activity  Goal: Patient/Family Education  Outcome: Resolved/Met     Problem: Falls - Risk of  Goal: *Absence of Falls  Description: Document Sara Fall Risk and appropriate interventions in the flowsheet.   Outcome: Resolved/Met  Note: Fall Risk Interventions:  Mobility Interventions: Assess mobility with egress test,Bed/chair exit alarm,Patient to call before getting OOB,PT Consult for mobility concerns,Utilize walker, cane, or other assistive device         Medication Interventions: Bed/chair exit alarm,Patient to call before getting OOB,Teach patient to arise slowly    Elimination Interventions: Bed/chair exit alarm,Call light in reach              Problem: Patient Education: Go to Patient Education Activity  Goal: Patient/Family Education  Outcome: Resolved/Met     Problem: Patient Education: Go to Patient Education Activity  Goal: Patient/Family Education  Outcome: Resolved/Met     Problem: Knee Replacement: Post-Op Day 1  Goal: Off Pathway (Use only if patient is Off Pathway)  Outcome: Resolved/Met  Goal: Activity/Safety  Outcome: Resolved/Met  Goal: Diagnostic Test/Procedures  Outcome: Resolved/Met  Goal: Nutrition/Diet  Outcome: Resolved/Met  Goal: Medications  Outcome: Resolved/Met  Goal: Respiratory  Outcome: Resolved/Met  Goal: Treatments/Interventions/Procedures  Outcome: Resolved/Met  Goal: Psychosocial  Outcome: Resolved/Met  Goal: Discharge Planning  Outcome: Resolved/Met  Goal: *Demonstrates progressive activity  Outcome: Resolved/Met  Goal: *Optimal pain control at patient's stated goal  Outcome: Resolved/Met  Goal: *Hemodynamically stable  Outcome: Resolved/Met  Goal: *Discharge plan identified  Outcome: Resolved/Met

## 2022-05-27 NOTE — PROGRESS NOTES
Ortho / Neurosurgery NP Note    POD# 1  s/p LEFT TOTAL KNEE ARTHROPLASTY WITH NAVIGATION   Pt seen with no visitor present. Pt resting in bed, in NAD. Awake and alert today. No complaints. Post-op pain well controlled iwth oxycodone. LLE numbness resolved overnight. Tolerating regular diet. No nausea. VSS Afebrile. Room air. Visit Vitals  /67   Pulse 64   Temp 98.7 °F (37.1 °C)   Resp 16   Ht 5' 5\" (1.651 m)   Wt 111.5 kg (245 lb 13 oz)   SpO2 100%   Breastfeeding No   BMI 40.91 kg/m²       Voiding status: voiding   Output (mL)  Urine Voided: 800 ml (05/26/22 1959)  Last Bowel Movement Date: 05/26/22 (05/26/22 1019)  Unmeasurable Output  Urine Occurrence(s): 1 (patient voided in pre op) (05/26/22 0654)      Labs    Lab Results   Component Value Date/Time    HGB 12.4 05/27/2022 05:17 AM      Lab Results   Component Value Date/Time    INR 1.0 05/19/2022 03:30 PM      Lab Results   Component Value Date/Time    Sodium 138 05/27/2022 05:17 AM    Potassium 3.8 05/27/2022 05:17 AM    Chloride 105 05/27/2022 05:17 AM    CO2 25 05/27/2022 05:17 AM    Glucose 163 (H) 05/27/2022 05:17 AM    BUN 12 05/27/2022 05:17 AM    Creatinine 0.89 05/27/2022 05:17 AM    Calcium 8.6 05/27/2022 05:17 AM     Recent Glucose Results:   Lab Results   Component Value Date/Time     (H) 05/27/2022 05:17 AM           Body mass index is 40.91 kg/m². : A BMI > 30 is classified as obesity and > 40 is classified as morbid obesity. Dressing c.d.i  Cryotherapy in place over incision  Calves soft and supple;  No pain with passive stretch  Sensation and motor intact - PF/DF/EHL intact   SCDs for mechanical DVT proph while in bed     PLAN:  1) PT BID - WBAT  2) Aspirin 81 mg PO BID for DVT Prophylaxis   3) GI Prophylaxis - Pepcid  4) Readniess for discharge:     [x] Vital Signs stable    [x] Hgb stable    [x] + Voiding    [x] Wound intact, drainage minimal    [x] Tolerating PO intake     [] Cleared by PT (OT if applicable)     [] Stair training completed (if applicable)    [] Independent / Contact Guard Assist (household distance)     [] Bed mobility     [] Car transfers     [] ADLs    [x] Adequate pain control on oral medication alone     Discharge home today pending therapy clearance. Home with HH and 's support.      Yashira Bauman NP

## 2022-05-27 NOTE — PROGRESS NOTES
End of Shift Note    Bedside shift change report given to Herminia RN and Maxi Dee, RN (oncoming nurses) by Buster Willingham (offgoing nurse). Report included the following information SBAR, Kardex, Procedure Summary, Intake/Output and Recent Results    Shift worked:  night     Shift summary and any significant changes:    Pt's vital signs are stable. Pt is tolerating PO better last night. Pain controlled with 5mg oxy x1 needed. Will work again with PT today. Pt is voiding. Concerns for physician to address: N/A   Zone phone for oncoming shift:       Activity:  Activity Level: Up with Assistance  Number times ambulated in hallways past shift: 0  Number of times OOB to chair past shift: 0    Cardiac:   Cardiac Monitoring: No      Cardiac Rhythm: Sinus Rhythm    Access:   Current line(s): PIV     Genitourinary:   Urinary status: voiding and external catheter    Respiratory:   O2 Device: None (Room air)  Chronic home O2 use?: NO  Incentive spirometer at bedside: YES       GI:  Last Bowel Movement Date: 05/26/22  Current diet:  ADULT DIET Regular  Passing flatus: YES  Tolerating current diet: YES       Pain Management:   Patient states pain is manageable on current regimen: YES    Skin:  Clemente Score: 19  Interventions: increase time out of bed, PT/OT consult, internal/external urinary devices and nutritional support     Patient Safety:  Fall Score:  Total Score: 3  Interventions: bed/chair alarm, assistive device (walker, cane, etc), gripper socks and pt to call before getting OOB  High Fall Risk: Yes    Length of Stay:  Expected LOS: - - -  Actual LOS: [facility]

## 2022-05-27 NOTE — PROGRESS NOTES
Problem: Patient Education: Go to Patient Education Activity  Goal: Patient/Family Education  Outcome: Progressing Towards Goal     Problem: Pain  Goal: *Control of Pain  Outcome: Progressing Towards Goal     Problem: Patient Education: Go to Patient Education Activity  Goal: Patient/Family Education  Outcome: Progressing Towards Goal     Problem: Falls - Risk of  Goal: *Absence of Falls  Description: Document Art Corona Fall Risk and appropriate interventions in the flowsheet.   Outcome: Progressing Towards Goal  Note: Fall Risk Interventions:  Mobility Interventions: Assess mobility with egress test,Bed/chair exit alarm,Patient to call before getting OOB,PT Consult for mobility concerns,Utilize walker, cane, or other assistive device         Medication Interventions: Bed/chair exit alarm,Patient to call before getting OOB,Teach patient to arise slowly    Elimination Interventions: Bed/chair exit alarm,Call light in reach              Problem: Patient Education: Go to Patient Education Activity  Goal: Patient/Family Education  Outcome: Progressing Towards Goal     Problem: Patient Education: Go to Patient Education Activity  Goal: Patient/Family Education  Outcome: Progressing Towards Goal     Problem: Knee Replacement: Post-Op Day 1  Goal: Off Pathway (Use only if patient is Off Pathway)  Outcome: Progressing Towards Goal  Goal: Activity/Safety  Outcome: Progressing Towards Goal  Goal: Diagnostic Test/Procedures  Outcome: Progressing Towards Goal  Goal: Nutrition/Diet  Outcome: Progressing Towards Goal  Goal: Medications  Outcome: Progressing Towards Goal  Goal: Respiratory  Outcome: Progressing Towards Goal  Goal: Treatments/Interventions/Procedures  Outcome: Progressing Towards Goal  Goal: Psychosocial  Outcome: Progressing Towards Goal  Goal: Discharge Planning  Outcome: Progressing Towards Goal  Goal: *Demonstrates progressive activity  Outcome: Progressing Towards Goal  Goal: *Optimal pain control at patient's stated goal  Outcome: Progressing Towards Goal  Goal: *Hemodynamically stable  Outcome: Progressing Towards Goal  Goal: *Discharge plan identified  Outcome: Progressing Towards Goal

## 2022-05-27 NOTE — PROGRESS NOTES
Spiritual Care Assessment/Progress Note  [facility]      NAME: Alcira Mackey      MRN: [id number]  AGE: 61 y.o.  SEX: female  Mormon Affiliation: Quaker   Language: English     5/27/2022     Total Time (in minutes): 11     Spiritual Assessment begun in [facility] through conversation with:         [x]Patient        [] Family    [] Friend(s)        Reason for Consult: Initial/Spiritual assessment, patient floor     Spiritual beliefs: (Please include comment if needed)     [x] Identifies with a toni tradition:         [] Supported by a toni community:            [] Claims no spiritual orientation:           [] Seeking spiritual identity:                [] Adheres to an individual form of spirituality:           [] Not able to assess:                           Identified resources for coping:      [x] Prayer                               [] Music                  [] Guided Imagery     [x] Family/friends                 [] Pet visits     [] Devotional reading                         [] Unknown     [] Other:                                               Interventions offered during this visit: (See comments for more details)    Patient Interventions: Affirmation of emotions/emotional suffering,Catharsis/review of pertinent events in supportive environment,Initial/Spiritual assessment, patient floor,Prayer (assurance of)     Family/Friend(s): Initial Assessment,Prayer (assurance of)     Plan of Care:     [x] Support spiritual and/or cultural needs    [] Support AMD and/or advance care planning process      [] Support grieving process   [] Coordinate Rites and/or Rituals    [] Coordination with community clergy   [] No spiritual needs identified at this time   [] Detailed Plan of Care below (See Comments)  [] Make referral to Music Therapy  [] Make referral to Pet Therapy     [] Make referral to Addiction services  [] Make referral to [facility]  [] Make referral to Spiritual Care Partner  [] No future visits requested        [x] Contact Spiritual Care for further referrals     Comments: Visited Ms Mal Lilly in room [id number] for initial spiritual assessment; reviewed patient's chart prior to visit. Ms Mal Lilly was sitting up in a chair eating lunch; a male visitor was with her. Patient and visitor appeared in good spirits. Provided spiritual presence and active listening as Ms Mal Lilly shared that a [facility] Partner had just visited with them. Said that she had no concerns to share and that the SCP had had a prayer with them. She stated that she was hoping to be able to go home soon. Assured her of ongoing  availability for support. : Rev. Orly Herrera.  Travis Miller; [facility], to contact [phone] [address] call: [phone]

## 2022-05-27 NOTE — DISCHARGE SUMMARY
Ortho Discharge Summary    Patient ID:  Gregory Shell  [id number]  female  61 y.o.  1955    Admit date: 5/26/2022    Discharge date: 5/27/2022    Admitting Physician: Sheila Orlando MD     Consulting Physician(s):   Treatment Team: Attending Provider: Lou Fernandez MD; Primary Nurse: Angelo Pelletier, RN; Physical Therapy Assistant: Gaviota He PTA; Care Manager: Angeline Ochoa    Date of Surgery:   5/26/2022     Preoperative Diagnosis:  Primary osteoarthritis of left knee [M17.12]    Postoperative Diagnosis:   Primary osteoarthritis of left knee [M17.12]    Procedure(s):   LEFT TOTAL KNEE ARTHROPLASTY WITH NAVIGATION     Anesthesia Type:   General     Surgeon: Lou Fernandez MD                            HPI:  Pt is a 61 y.o. female who has a history of Primary osteoarthritis of left knee [M17.12]  with pain and limitations of activities of daily living who presents at this time for a LEFT TOTAL KNEE ARTHROPLASTY WITH NAVIGATION following the failure of conservative management. PMH:   Past Medical History:   Diagnosis Date    Adverse effect of anesthesia     slow to wake after anesthesia     Arthritis     At risk for sleep apnea 04/25/2022    SHAHID 6 w/ reported witnessed apnea while sleeping     Breast cancer ([facility] [address]     Rt 2016    Cancer ([facility] [address]     right breast    COVID-19 03/29/2022    Elevated hemoglobin A1c 04/25/2022    A1C-6.1    Endometriosis     Headache     Hypertension     Paget's disease of breast ([facility] [address]     history of     Postoperative nausea and vomiting        Body mass index is 40.91 kg/m². : A BMI > 30 is classified as obesity and > 40 is classified as morbid obesity. Medications upon admission :   Prior to Admission Medications   Prescriptions Last Dose Informant Patient Reported? Taking?   acetaminophen (Tylenol Extra Strength) 500 mg tablet 5/19/2022 at Unknown time  Yes Yes   Sig: Take 1,000 mg by mouth every six (6) hours as needed for Pain.    loperamide (IMMODIUM) 2 mg tablet 5/26/2022 at 0500  Yes Yes   Sig: Take 2 mg by mouth four (4) times daily as needed for Diarrhea.   losartan-hydroCHLOROthiazide (HYZAAR) 50-12.5 mg per tablet 5/26/2022 at 0500  Yes Yes   Sig: Take 1 Tablet by mouth daily. Facility-Administered Medications: None        Allergies: Allergies   Allergen Reactions    Adhesive Tape-Silicones Rash    Other Plant, Animal, Environmental Rash and Itching     Patient reports rash and itching on hands from powder in gloves        Hospital Course: The patient underwent surgery. Complications:  None; patient tolerated the procedure well. Was taken to the PACU in stable condition and then transferred to the ortho floor. Perioperative Antibiotics:  Ancef     Postoperative Pain Management:  Oxycodone & Tylenol     DVT Prophylaxis: Aspirin 81 BID    Postoperative transfusions:    Number of units banked PRBCs =   none     Post Op complications: none    Hemoglobin at discharge:    Lab Results   Component Value Date/Time    HGB 12.4 05/27/2022 05:17 AM    INR 1.0 05/19/2022 03:30 PM       Dressing remained clean, dry and intact. No significant erythema or swelling. Wound appears to be healing without any evidence of infection. Neurovascular exam found to be within normal limits. Physical Therapy started following surgery and participated in bed mobility, transfers and ambulation. Discharged to: Home with [facility]. Condition on Discharge:   stable    Discharge instructions:  - Anticoagulate with Aspirin 81 BID  - Take pain medications as prescribed  - Resume pre hospital diet      - Discharge activity: activity as tolerated  - Ambulate with assistive device as needed. - Weight bearing status - WBAT  - Wound Care Keep wound clean and dry. See discharge instruction sheet.             -DISCHARGE MEDICATION LIST     Current Discharge Medication List      START taking these medications    Details   aspirin delayed-release 81 mg tablet Take 1 Tablet by mouth two (2) times a day for 30 days. Qty: 60 Tablet, Refills: 0  Start date: 5/27/2022, End date: 6/26/2022      famotidine (PEPCID) 20 mg tablet Take 1 Tablet by mouth two (2) times a day for 30 days. Qty: 60 Tablet, Refills: 0  Start date: 5/27/2022, End date: 6/26/2022      oxyCODONE IR (ROXICODONE) 5 mg immediate release tablet Take 1-2 Tablets by mouth every four (4) hours as needed for Pain for up to 7 days. Max Daily Amount: 60 mg.  Qty: 40 Tablet, Refills: 0  Start date: 5/27/2022, End date: 6/3/2022    Associated Diagnoses: Status post total left knee replacement      polyethylene glycol (MIRALAX) 17 gram packet Take 1 Packet by mouth daily for 7 days. Qty: 7 Packet, Refills: 0  Start date: 5/28/2022, End date: 6/4/2022      senna-docusate (PERICOLACE) 8.6-50 mg per tablet Take 1 Tablet by mouth two (2) times a day for 7 days. Qty: 14 Tablet, Refills: 0  Start date: 5/27/2022, End date: 6/3/2022         CONTINUE these medications which have NOT CHANGED    Details   losartan-hydroCHLOROthiazide (HYZAAR) 50-12.5 mg per tablet Take 1 Tablet by mouth daily. acetaminophen (Tylenol Extra Strength) 500 mg tablet Take 1,000 mg by mouth every six (6) hours as needed for Pain.       loperamide (IMMODIUM) 2 mg tablet Take 2 mg by mouth four (4) times daily as needed for Diarrhea.          per medical continuation form      -Follow up in office in 2 weeks      Signed:  Katerina Riggins NP  Orthopaedic Nurse Practitioner    5/27/2022  12:25 PM

## 2022-05-27 NOTE — PROGRESS NOTES
Spiritual Care Partner Volunteer visited patient at [address] in [facility] on 5/27/2022   Documented by: Amelia Mac.    Paging Service: [phone])

## 2022-05-27 NOTE — PROGRESS NOTES
Transition of Care Plan:  RUR: 4% low   Disposition: home with [facility] health[facility]  Follow up appointments: ortho  DME needed: RW- provided by [facility]  Transportation at Discharge: - at bedside   Keys or means to access home: no  IM Medicare Letter: n/a commercial insurance   Is patient a BCPI-A Bundle: no   If yes, was Bundle Letter given?:    Is patient a  and connected with the [place]? no               If yes, was [employer] transfer form completed and [facility] notified? Caregiver Contact:  Emiliano Bassett [phone]  Discharge Caregiver contacted prior to discharge? yes  Care Conference needed?: no                  Reason for Admission:  Left total knee arthroplasty                    RUR Score: 4%                   Plan for utilizing home health: per recommendation       PCP: First and Last name:     [facility]    Are you a current patient: Yes/No:    Approximate date of last visit:    Can you participate in a virtual visit with your PCP: yes                    Current Advanced Directive/Advance Care Plan: Patient declined having an ACP and declined. Reported her  would be POA    Healthcare Decision Maker:   Click here to [address] including selection of the Healthcare Decision Maker Relationship (ie \"Primary\")                         Transition of Care Plan:                      CM made room visit with patient and  at bedside. Pt confirmed demographics, insurance, and emergency contact on file. Pt, , and son (who has special needs- high functioning, only requires supervision and sometimes verbal cues for ADLs) live in a 2 story home with 3 ana. Pt's bedroom is on the 2nd floor with 13 steps. Pt also has a supportive son that lives 5 minutes away as well as additional supportive friends and family. Pt has a wheelchair and rollator. Will need a RW prior to dc. At baseline pt is independent with ADLs and driving.  Pt has no hx of HH, SNF, or IPR. Pt's plan is to d/c home with home health. FOC signed for at home care. Referral sent and accepted. avs updated. Referral for rw sent to [facility] and approved and provided to pt at bedside. avs updated. Care Management Interventions  PCP Verified by CM: Yes  Mode of Transport at Discharge:  Other (see comment) ( )  Transition of Care Consult (CM Consult): Discharge [address] [facility] [address]:  (rw)  Discharge Durable Medical Equipment: Yes  Physical Therapy Consult: Yes  Occupational Therapy Consult: No  Speech Therapy Consult: No  Support Systems: Spouse/Significant Other,Child(shaji),Other Family Member(s),Friend/Neighbor  Confirm Follow Up Transport: Family  The Plan for Transition of Care is Related to the Following Treatment Goals : hh  The Patient and/or Patient Representative was Provided with a Choice of Provider and Agrees with the Discharge Plan?: Yes  Freedom of Choice List was Provided with Basic Dialogue that Supports the Patient's Individualized Plan of Care/Goals, Treatment Preferences and Shares the Quality Data Associated with the Providers?: Yes  Discharge Location  Patient Expects to be Discharged to[de-identified] Home with home health    Roylene Hashimoto, [address] [facility], [address] [facility]

## 2022-05-27 NOTE — PROGRESS NOTES
Problem: Mobility Impaired (Adult and Pediatric)  Goal: *Acute Goals and Plan of Care (Insert Text)  Description: FUNCTIONAL STATUS PRIOR TO ADMISSION: Patient was independent and active without use of DME.    HOME SUPPORT PRIOR TO ADMISSION: The patient lived with  and 26 y/o son who has Autism. Physical Therapy Goals  Initiated 5/26/2022    1. Patient will move from supine to sit and sit to supine  in bed with modified independence within 4 days. 2. Patient will perform sit to stand with modified independence within 4 days. 3. Patient will ambulate with modified independence for 120 feet with the least restrictive device within 4 days. 4. Patient will ascend/descend 4 stairs with 1 handrail(s) with modified independence within 4 days. 5. Patient will perform home exercise program per protocol with independence within 4 days. 6. Patient will demonstrate AROM 0-90 degrees in operative joint within 4 days. Outcome: Progressing Towards Goal   PHYSICAL THERAPY TREATMENT  Patient: Sandee Klinefelter (45 y.o. female)  Date: 5/27/2022  Diagnosis: Primary osteoarthritis of left knee [M17.12]  Osteoarthritis of left knee [M17.12] <principal problem not specified>  Procedure(s) (LRB):  LEFT TOTAL KNEE ARTHROPLASTY WITH NAVIGATION (Left) 1 Day Post-Op  Precautions: Fall  Chart, physical therapy assessment, plan of care and goals were reviewed. ASSESSMENT  Patient continues with skilled PT services and is progressing towards goals. Pt received in supine and anxious however willing to participate with therapy. Pt requiring Mitch with LE management and cues for proper sequencing/hand placements for supine to sitting transfer. Overall moves CGA with mobility. Pt ambulating to bathroom and able to perform pat care and hand hygiene CGA with no loss of balance and with good RW negotiation in tighter spaces.  Ambulated 250ft CGA w/RW with slightly antalgic gait, decreased stance on LLE, however no loss of balance. Gait initially with step-to gait pattern and able to progress to step-through with cueing. Pt with decreased endurance requiring additional time and rest breaks between activities. Pt able to perform stair training ascending/descending 4 steps CGA using bilateral railings with cues for proper sequencing. Pt also able to perform car transfer training with no safety concerns. Pt returned to room via wheelchair and left seated in chair with all needs met and in no discomfort. Pt has been cleared from a physical therapy standpoint. Vitals:    05/27/22 1125 05/27/22 1128 05/27/22 1132   BP: 127/75 132/83 (!) 133/96   BP 1 Location: Right upper arm Right upper arm Right upper arm   BP Patient Position: At rest Sitting Standing   Pulse: 65 78 86   Temp:      Resp:      Height:      Weight:      SpO2:           Current Level of Function Impacting Discharge (mobility/balance): CGA-Mitch w/bed mobility, CGA w/transfers, CGA w/RW for gait    Other factors to consider for discharge: has support         PLAN :  Patient continues to benefit from skilled intervention to address the above impairments. Continue treatment per established plan of care. to address goals. Recommendation for discharge: (in order for the patient to meet his/her long term goals)  Physical therapy at least 2 days/week in the home AND ensure assist and/or supervision for safety with mobility & ADL's    This discharge recommendation:  Has been made in collaboration with the attending provider and/or case management    IF patient discharges home will need the following DME: rolling walker       SUBJECTIVE:   Patient stated this is a thousand times better today, but you've also gave me the hardest workout of this week.     OBJECTIVE DATA SUMMARY:   Critical Behavior:  Neurologic State: Alert,Appropriate for age     Functional Mobility Training:  Bed Mobility:  Rolling: Contact guard assistance; Additional time;Bed Modified  Supine to Sit: Minimum assistance; Additional time;Bed Modified     Scooting: Contact guard assistance; Additional time     Transfers:  Sit to Stand: Contact guard assistance  Stand to Sit: Contact guard assistance        Bed to Chair: Contact guard assistance    Balance:  Sitting: Intact  Sitting - Static: Good (unsupported); Fair (occasional)  Standing: Impaired  Standing - Static: Constant support;Good (RW)  Standing - Dynamic : Fair;Constant support (RW)  Ambulation/Gait Training:  Distance (ft): 300 Feet (ft)  Assistive Device: Gait belt;Walker, rolling  Ambulation - Level of Assistance: Contact guard assistance     Gait Abnormalities: Antalgic;Decreased step clearance     Base of Support: Widened  Stance: Left decreased  Speed/Lluvia: Pace decreased (<100 feet/min)  Step Length: Left shortened;Right shortened     Interventions: Safety awareness training    Stairs:     Stairs - Level of Assistance: Contact guard assistance   Rail Use: Right     Pain Rating:  Pt reports 4-5/10 pain with WB'ing     Activity Tolerance:   Fair and requires rest breaks    After treatment patient left in no apparent distress:   Sitting in chair, Call bell within reach, and Caregiver / family present    COMMUNICATION/COLLABORATION:   The patients plan of care was discussed with: Nurse Practitioner & Registered nurse.      Marian Jackson, PTA   Time Calculation: 53 mins

## 2022-05-27 NOTE — PROGRESS NOTES
DISCHARGE NOTE FROM [facility]    Patient determined to be stable for discharge by attending provider. I have reviewed the discharge instructions with the patient and spouse. They verbalized understanding and all questions were answered to their satisfaction. No complaints or further questions were expressed. Medications sent to pharmacy. Appropriate educational materials and medication side effect teaching were provided. PIV were removed prior to discharge. Patient did not discharge with any line, garcia, or drain. Personal items and valuables accounted for at discharge by patient and/or family: YES    Post-op patient: Yes- Patient given post-op discharge kit and instructed on use.        Irving Gómez, RN

## (undated) DEVICE — PREP SKN CHLRAPRP APL 26ML STR --

## (undated) DEVICE — REM POLYHESIVE ADULT PATIENT RETURN ELECTRODE: Brand: VALLEYLAB

## (undated) DEVICE — SOLUTION IRRIG 1000ML STRL H2O USP PLAS POUR BTL

## (undated) DEVICE — SURGICAL PROCEDURE PACK BASIN MAJ SET CUST NO CAUT

## (undated) DEVICE — SUTURE ABSRB L30CM 2-0 VLT SPRL PDS + STRATAFIX SXPP1B410

## (undated) DEVICE — SUTURE MCRYL SZ 4-0 L27IN ABSRB UD L19MM PS-2 1/2 CIR PRIM Y426H

## (undated) DEVICE — GLOVE SURG SZ 8 L12IN FNGR THK79MIL GRN LTX FREE

## (undated) DEVICE — SUTURE MCRYL SZ 3-0 L27IN ABSRB UD L19MM PS-2 3/8 CIR PRIM Y427H

## (undated) DEVICE — TOWEL SURG W17XL27IN STD BLU COT NONFENESTRATED PREWASHED

## (undated) DEVICE — STAPLER SKIN SQ 30 ABSRB STPL DISP INSORB

## (undated) DEVICE — BLADE ELECTRODE: Brand: EDGE

## (undated) DEVICE — STRYKER PERFORMANCE SERIES SAGITTAL BLADE: Brand: STRYKER PERFORMANCE SERIES

## (undated) DEVICE — SUTURE VCRL SZ 1 L36IN ABSRB UD L36MM CT-1 1/2 CIR J947H

## (undated) DEVICE — DRAPE,CHEST,FENES,15X10,STERIL: Brand: MEDLINE

## (undated) DEVICE — INFECTION CONTROL KIT SYS

## (undated) DEVICE — TOTAL JOINT-MRMC: Brand: MEDLINE INDUSTRIES, INC.

## (undated) DEVICE — ZIMMER® STERILE DISPOSABLE TOURNIQUET CUFF WITH PROTECTIVE SLEEVE AND PLC, DUAL PORT, SINGLE BLADDER, 34 IN. (86 CM)

## (undated) DEVICE — SYR 20ML LL STRL LF --

## (undated) DEVICE — 3M™ MICROFOAM™ SURGICAL TAPE 4 ROLLS/CARTON 6 CARTONS/CASE 1528-3: Brand: 3M™ MICROFOAM™

## (undated) DEVICE — PIN EXT FIX SCHNZ 3 MM

## (undated) DEVICE — ROCKER SWITCH PENCIL BLADE ELECTRODE, HOLSTER: Brand: EDGE

## (undated) DEVICE — SUTURE STRATAFIX SPRL SZ 1 L14IN ABSRB VLT L48CM CTX 1/2 SXPD2B405

## (undated) DEVICE — COVER,TABLE,60X90,STERILE: Brand: MEDLINE

## (undated) DEVICE — SOLUTION IRRIG 3000ML 0.9% SOD CHL USP UROMATIC PLAS CONT

## (undated) DEVICE — DEVON™ KNEE AND BODY STRAP 60" X 3" (1.5 M X 7.6 CM): Brand: DEVON

## (undated) DEVICE — GAUZE SPONGES,12 PLY: Brand: CURITY

## (undated) DEVICE — STERILE POLYISOPRENE POWDER-FREE SURGICAL GLOVES: Brand: PROTEXIS

## (undated) DEVICE — SYSTEM IMPL DEL FOR BRST IMPL FUN (SEE COMMENT)

## (undated) DEVICE — PACK SURG PROC KNEE USER GPS

## (undated) DEVICE — BANDAGE COMPR M W6INXL10YD WHT BGE VELC E MTRX HK AND LOOP

## (undated) DEVICE — SUTURE VCRL SZ 0 L36IN ABSRB UD L36MM CT-1 1/2 CIR J946H

## (undated) DEVICE — DRAPE,REIN 53X77,STERILE: Brand: MEDLINE

## (undated) DEVICE — KENDALL SCD EXPRESS SLEEVES, KNEE LENGTH, MEDIUM: Brand: KENDALL SCD

## (undated) DEVICE — (D)PREP SKN CHLRAPRP APPL 26ML -- CONVERT TO ITEM 371833

## (undated) DEVICE — DRESSING WND ISLAND STD 4X10 IN MULT LAYR STRL SILVERLON

## (undated) DEVICE — Device: Brand: JELCO

## (undated) DEVICE — DERMABOND SKIN ADH 0.7ML -- DERMABOND ADVANCED 12/BX

## (undated) DEVICE — HANDLE LT SNAP ON ULT DURABLE LENS FOR TRUMPF ALC DISPOSABLE

## (undated) DEVICE — HANDPIECE SET WITH COAXIAL HIGH FLOW TIP AND SUCTION TUBE: Brand: INTERPULSE

## (undated) DEVICE — SYSTEM SKIN CLSR 22CM DERMBND PRINEO

## (undated) DEVICE — SOLUTION IV 1000ML 0.9% SOD CHL

## (undated) DEVICE — TRANSFER SET 3": Brand: MEDLINE INDUSTRIES, INC.

## (undated) DEVICE — GLOVE ORTHO 8   MSG9480

## (undated) DEVICE — 1200 GUARD II KIT W/5MM TUBE W/O VAC TUBE: Brand: GUARDIAN

## (undated) DEVICE — PREP KIT PEEL PTCH POVIDONE IOD

## (undated) DEVICE — 3M™ IOBAN™ 2 ANTIMICROBIAL INCISE DRAPE 6650EZ: Brand: IOBAN™ 2

## (undated) DEVICE — SUTURE STRATAFIX SZ 3-0 30CM NONABSORB UD 26MM FS 3/8 SXMP2B412